# Patient Record
Sex: FEMALE | Race: WHITE | Employment: FULL TIME | URBAN - METROPOLITAN AREA
[De-identification: names, ages, dates, MRNs, and addresses within clinical notes are randomized per-mention and may not be internally consistent; named-entity substitution may affect disease eponyms.]

---

## 2017-03-27 DIAGNOSIS — E03.4 HYPOTHYROIDISM DUE TO ACQUIRED ATROPHY OF THYROID: ICD-10-CM

## 2017-03-27 RX ORDER — LIOTHYRONINE SODIUM 5 UG/1
TABLET ORAL
Qty: 270 TAB | Refills: 3 | Status: SHIPPED | OUTPATIENT
Start: 2017-03-27 | End: 2017-06-06 | Stop reason: SDUPTHER

## 2017-03-27 RX ORDER — LEVOTHYROXINE SODIUM 75 UG/1
75 TABLET ORAL
Qty: 90 TAB | Refills: 3 | Status: SHIPPED | OUTPATIENT
Start: 2017-03-27 | End: 2017-06-18 | Stop reason: SDUPTHER

## 2017-03-27 NOTE — TELEPHONE ENCOUNTER
From: Alfonzo Barrow  To: Chico Plummer MD  Sent: 3/27/2017 12:35 PM EDT  Subject: Medication Renewal Request    Original authorizing provider: MD Alfonzo Panda would like a refill of the following medications:  liothyronine (CYTOMEL) 5 mcg tablet Chico Plummer MD]  levothyroxine (SYNTHROID) 75 mcg tablet [Vanessa Aldana MD]    Preferred pharmacy: [address] [place] [address] Way:

## 2017-06-06 ENCOUNTER — OFFICE VISIT (OUTPATIENT)
Dept: ENDOCRINOLOGY | Age: 47
End: 2017-06-06

## 2017-06-06 VITALS
WEIGHT: 228.8 LBS | SYSTOLIC BLOOD PRESSURE: 141 MMHG | BODY MASS INDEX: 40.54 KG/M2 | DIASTOLIC BLOOD PRESSURE: 76 MMHG | HEART RATE: 80 BPM | TEMPERATURE: 97.4 F | RESPIRATION RATE: 18 BRPM | HEIGHT: 63 IN | OXYGEN SATURATION: 99 %

## 2017-06-06 DIAGNOSIS — N91.2 AMENORRHEA: ICD-10-CM

## 2017-06-06 DIAGNOSIS — E53.8 VITAMIN B12 DEFICIENCY: ICD-10-CM

## 2017-06-06 DIAGNOSIS — E03.4 HYPOTHYROIDISM DUE TO ACQUIRED ATROPHY OF THYROID: ICD-10-CM

## 2017-06-06 DIAGNOSIS — E55.9 VITAMIN D DEFICIENCY: ICD-10-CM

## 2017-06-06 DIAGNOSIS — E06.3 HASHIMOTO'S DISEASE: Primary | ICD-10-CM

## 2017-06-06 NOTE — PROGRESS NOTES
[facility]               Ronnell Holder MD        [address] 78 [phone] Fax [phone]           Patient Information  Date:6/6/2017  Name : Krystyna Tolentino 41 y.o.     YOB: 1970         Referred by: Clifford Stanley MD         History of present illness    Krystyna Tolentino is a 41 y.o. female  here for fu of thyroid. Seen Reproductive Endo - was on clomid, underwent artificial insemination - no pregnancy   Has no cycles since May 2016, pregnancy test negative      She has not had menstrual cycles for almost a year, not on any birth control pills      On LT4 50 mcg along with Cytomel  Compliant with the medications    She  denies sleep apnea symptoms  Denies nervousness,shakiness,palpitations      No FH of thyroid cancer     Wt Readings from Last 3 Encounters:   06/06/17 228 lb 12.8 oz (103.8 kg)   11/22/16 226 lb (102.5 kg)   04/14/16 228 lb 8 oz (103.6 kg)       Past Medical History:   Diagnosis Date    Goiter diffuse, nontoxic     Hashimoto's disease     Primary hypothyroidism        Current Outpatient Prescriptions   Medication Sig    liothyronine (CYTOMEL) 5 mcg tablet TAKE 2 TABLETS BY MOUTH IN THE MORNING AND 1 TABLET AT 1PM    levothyroxine (SYNTHROID) 75 mcg tablet Take 1 Tab by mouth Daily (before breakfast).  ergocalciferol (VITAMIN D2) 50,000 unit capsule Take 1 Cap by mouth every thirty (30) days. No current facility-administered medications for this visit. Review of Systems:  -   - Eyes: no blurry vision or double vision  - Cardiovascular: no chest pain or palpitations  - Musculoskeletal: no joint pains + weakness  - Integumentary: no rashes  -     Physical Examination:  - Blood pressure 141/76, pulse 80, temperature 97.4 °F (36.3 °C), temperature source Oral, resp. rate 18, height 5' 3\" (1.6 m), weight 228 lb 12.8 oz (103.8 kg), SpO2 99 %. Body mass index is 40.53 kg/(m^2).   - General: pleasant, no distress, good eye contact  - HEENT: no exopthalmos, no periorbital edema, no scleral/conjunctival injection, EOMI, no lid lag or stare  - Neck: supple,thyromegaly - US - no goiter   - Cardiovascular: regular,  normal S1 and S2, no murmurs  - Respiratory: clear to auscultation bilaterally  - Gastrointestinal: soft, nontender, nondistended, BS +  - Musculoskeletal: no tremors, no edema  - Neurological: alert and oriented   - Psychiatric: normal mood and affect  - Skin - normal turgor    Data Reviewed:     Lab Results   Component Value Date/Time    TSH 3.310 06/01/2017 09:07 AM    T4, Free 1.23 08/05/2014 02:46 PM      US -   Impression:  The thyroid gland is diffusely heterogenous in texture with hypoechoic micronodules and surroundning echogenic rim  suggestive of Hashimoto's thyroiditis. No dominant nodules    [x] Reviewed labs    Assessment/Plan:     1. Hashimoto's disease/Primary hypothyroidism - no goiter on US 5/14  2. Obesity Body mass index is 40.53 kg/(m^2). 3. Vitamin D deficiency  4. Secondary amenorrhea - workup secondary amenorrhea: FSH, estradiol, testosterone prolactin, TSH normal     Reviewed labs and discussed with pt    Levothyroxine  75 mcg daily, continue Cytomel. Goal of TSH pre-pregnancy and pregnancy less than 2.5.     -  ,Cytomel 10 mcg daily and 5 mcg noon   Supplement Vit D , stopped Vit B12   Denies sleep apnea symptoms -         Follow-up Disposition: Not on File    Thank you for allowing me to participate in the care of this patient.     Jose Francis MD

## 2017-06-06 NOTE — MR AVS SNAPSHOT
Visit Information Date & Time Provider Department Dept. Phone Encounter #  
 6/6/2017  8:45 AM Cain James MD [facility] Diabetes [facility] Endocrinology [phone] [id number] Follow-up Instructions Return in about 6 months (around 12/6/2017). Upcoming Health Maintenance Date Due DTaP/Tdap/Td series (1 - Tdap) 4/15/1993 PAP AKA CERVICAL CYTOLOGY 4/15/1993 INFLUENZA AGE 9 TO ADULT 8/1/2017 Allergies as of 6/6/2017  Review Complete On: 6/6/2017 By: Cain James MD  
 No Known Allergies Current Immunizations  Never Reviewed No immunizations on file. Not reviewed this visit You Were Diagnosed With   
  
 Codes Comments Hashimoto's disease    -  Primary ICD-10-CM: E06.3 ICD-9-CM: 245.2 Hypothyroidism due to acquired atrophy of thyroid     ICD-10-CM: E03.4 ICD-9-CM: 244.8, 246.8 Vitamin B12 deficiency     ICD-10-CM: E53.8 ICD-9-CM: 266.2 Vitamin D deficiency     ICD-10-CM: E55.9 ICD-9-CM: 268.9 Amenorrhea     ICD-10-CM: N91.2 ICD-9-CM: 626.0 Vitals BP Pulse Temp Resp Height(growth percentile) Weight(growth percentile) 141/76 (BP 1 Location: Right arm, BP Patient Position: Sitting) 80 97.4 °F (36.3 °C) (Oral) 18 5' 3\" (1.6 m) 228 lb 12.8 oz (103.8 kg) SpO2 BMI OB Status Smoking Status 99% 40.53 kg/m2 Having regular periods Never Smoker Vitals History BMI and BSA Data Body Mass Index Body Surface Area 40.53 kg/m 2 2.15 m 2 Preferred Pharmacy Pharmacy Name Phone [facility] [address] [phone] Your Updated Medication List  
  
   
This list is accurate as of: 6/6/17  9:16 AM.  Always use your most recent med list.  
  
  
  
  
 ergocalciferol 50,000 unit capsule Commonly known as:  VITAMIN D2 Take 1 Cap by mouth every thirty (30) days. levothyroxine 75 mcg tablet Commonly known as:  SYNTHROID Take 1 Tab by mouth Daily (before breakfast). liothyronine 5 mcg tablet Commonly known as:  CYTOMEL  
TAKE 2 TABLETS BY MOUTH IN THE MORNING AND 1 TABLET AT 1PM  
  
  
  
  
We Performed the Following DHEA SULFATE [80790 CPT(R)] ESTRADIOL [id number] CPT(R)] FOLLICLE STIMULATING HORMONE [78690 CPT(R)] LIPID PANEL [07062 CPT(R)] LUTEINIZING HORMONE [id number] CPT(R)] PROLACTIN [39165 CPT(R)] TESTOSTERONE, TOTAL, FEMALE/CHILD [id number] CPT(R)] Follow-up Instructions Return in about 6 months (around 12/6/2017). Introducing [facility] & HEALTH SERVICES! Dear Antoni Saavedra: 
Thank you for requesting a [employer] account. Our records indicate that you already have an active [employer] account. You can access your account anytime at [email or web address] Did you know that you can access your hospital and ER discharge instructions at any time in [employer]? You can also review all of your test results from your hospital stay or ER visit. Additional Information If you have questions, please visit the Frequently Asked Questions section of the [employer] website at [email or web address] Remember, [employer] is NOT to be used for urgent needs. For medical emergencies, dial 911. Now available from your iPhone and Android! Please provide this summary of care documentation to your next provider. Your primary care clinician is listed as Jennifer Adame. If you have any questions after today's visit, please call [phone].

## 2017-06-06 NOTE — PROGRESS NOTES
Jennie Brennan is a 41 y.o. female here for   Chief Complaint   Patient presents with    Thyroid Problem       Wt Readings from Last 3 Encounters:   11/22/16 226 lb (102.5 kg)   04/14/16 228 lb 8 oz (103.6 kg)   09/15/15 230 lb (104.3 kg)     Temp Readings from Last 3 Encounters:   11/22/16 96.9 °F (36.1 °C) (Oral)   04/14/16 96.9 °F (36.1 °C) (Oral)   09/15/15 97.7 °F (36.5 °C) (Oral)     BP Readings from Last 3 Encounters:   11/22/16 143/64   04/14/16 135/77   09/15/15 144/75     Pulse Readings from Last 3 Encounters:   11/22/16 67   04/14/16 79   09/15/15 73

## 2017-06-18 DIAGNOSIS — E03.4 HYPOTHYROIDISM DUE TO ACQUIRED ATROPHY OF THYROID: ICD-10-CM

## 2017-06-18 LAB
CHOLEST SERPL-MCNC: 188 MG/DL (ref 100–199)
DHEA-S SERPL-MCNC: 47.7 UG/DL (ref 41.2–243.7)
ESTRADIOL SERPL-MCNC: 13 PG/ML
FSH SERPL-ACNC: 43.6 MIU/ML
HDLC SERPL-MCNC: 56 MG/DL
INTERPRETATION, 910389: NORMAL
LDLC SERPL CALC-MCNC: 111 MG/DL (ref 0–99)
LH SERPL-ACNC: 30 MIU/ML
PROLACTIN SERPL-MCNC: 10.4 NG/ML (ref 4.8–23.3)
TESTOST SERPL-MCNC: 13.8 NG/DL
TRIGL SERPL-MCNC: 105 MG/DL (ref 0–149)
VLDLC SERPL CALC-MCNC: 21 MG/DL (ref 5–40)

## 2017-06-18 NOTE — PROGRESS NOTES
Not yet menopausal but could be perimenopausal - meaning she is going through the change and may become menopausal soon

## 2017-06-19 RX ORDER — LEVOTHYROXINE SODIUM 75 UG/1
75 TABLET ORAL
Qty: 90 TAB | Refills: 3 | Status: SHIPPED | OUTPATIENT
Start: 2017-06-19 | End: 2017-12-08 | Stop reason: SDUPTHER

## 2017-06-19 RX ORDER — LIOTHYRONINE SODIUM 5 UG/1
TABLET ORAL
Qty: 270 TAB | Refills: 3 | Status: SHIPPED | OUTPATIENT
Start: 2017-06-19 | End: 2017-09-29 | Stop reason: SDUPTHER

## 2017-06-19 NOTE — TELEPHONE ENCOUNTER
From: Dominga Ibarra  To: Coleen Crawford MD  Sent: 6/18/2017 10:28 PM EDT  Subject: Medication Renewal Request    Original authorizing provider: MD Dominga Geller would like a refill of the following medications:  levothyroxine (SYNTHROID) 75 mcg tablet [Vanessa Cary MD]  liothyronine (CYTOMEL) 5 mcg tablet [Vanessa Cary MD]    Preferred pharmacy: [address] AT [facility]:

## 2017-06-21 ENCOUNTER — TELEPHONE (OUTPATIENT)
Dept: ENDOCRINOLOGY | Age: 47
End: 2017-06-21

## 2017-06-21 NOTE — TELEPHONE ENCOUNTER
Informed pt of results below.  Pt verbalized understanding     ----- Message from Jena Milian MD sent at 6/18/2017  3:46 PM EDT -----  Not yet menopausal but could be perimenopausal - meaning she is going through the change and may become menopausal soon

## 2017-09-18 ENCOUNTER — OFFICE VISIT (OUTPATIENT)
Dept: FAMILY MEDICINE CLINIC | Age: 47
End: 2017-09-18

## 2017-09-18 VITALS
BODY MASS INDEX: 41.11 KG/M2 | WEIGHT: 232 LBS | RESPIRATION RATE: 16 BRPM | HEART RATE: 69 BPM | SYSTOLIC BLOOD PRESSURE: 150 MMHG | TEMPERATURE: 98 F | HEIGHT: 63 IN | OXYGEN SATURATION: 98 % | DIASTOLIC BLOOD PRESSURE: 100 MMHG

## 2017-09-18 DIAGNOSIS — Z00.00 WELL ADULT EXAM: Primary | ICD-10-CM

## 2017-09-18 DIAGNOSIS — R10.11 RIGHT UPPER QUADRANT PAIN: ICD-10-CM

## 2017-09-18 RX ORDER — CYCLOBENZAPRINE HCL 5 MG
TABLET ORAL
Refills: 0 | COMMUNITY
Start: 2017-08-22 | End: 2017-09-18 | Stop reason: ALTCHOICE

## 2017-09-18 RX ORDER — BUTALBITAL, ACETAMINOPHEN AND CAFFEINE 50; 325; 40 MG/1; MG/1; MG/1
TABLET ORAL
Refills: 0 | COMMUNITY
Start: 2017-08-22 | End: 2017-09-18 | Stop reason: ALTCHOICE

## 2017-09-18 NOTE — MR AVS SNAPSHOT
Visit Information Date & Time Provider Department Dept. Phone Encounter #  
 9/18/2017 11:00 AM Elmer Love NP [facility] [facility] [phone] [id number] Your Appointments 12/8/2017  8:30 AM  
ROUTINE CARE with Dick Napier MD  
Care Diabetes & Endocrinology [address] [facility]) Appt Note: 6mo fu dm  
 [address] [facility] [address]  
[phone]  
  
   
 [address] [place] [address] Upcoming Health Maintenance Date Due DTaP/Tdap/Td series (1 - Tdap) 4/15/1993 PAP AKA CERVICAL CYTOLOGY 4/15/1993 INFLUENZA AGE 9 TO ADULT 8/1/2017 Allergies as of 9/18/2017  Review Complete On: 9/18/2017 By: Elmer Love NP No Known Allergies Current Immunizations  Never Reviewed No immunizations on file. Not reviewed this visit You Were Diagnosed With   
  
 Codes Comments Well adult exam    -  Primary ICD-10-CM: Z00.00 ICD-9-CM: V70.0 Right upper quadrant pain     ICD-10-CM: R10.11 ICD-9-CM: 789.01 Vitals BP Pulse Temp Resp Height(growth percentile) Weight(growth percentile) (!) 150/100 69 98 °F (36.7 °C) (Oral) 16 5' 3\" (1.6 m) 232 lb (105.2 kg) SpO2 BMI OB Status Smoking Status 98% 41.1 kg/m2 Unknown Never Smoker Vitals History BMI and BSA Data Body Mass Index Body Surface Area  
 41.1 kg/m 2 2.16 m 2 Preferred Pharmacy Pharmacy Name Phone [facility] [address] [place] [address] [phone] Your Updated Medication List  
  
   
This list is accurate as of: 9/18/17 11:39 AM.  Always use your most recent med list.  
  
  
  
  
 ergocalciferol 50,000 unit capsule Commonly known as:  VITAMIN D2 Take 1 Cap by mouth every thirty (30) days. levothyroxine 75 mcg tablet Commonly known as:  SYNTHROID Take 1 Tab by mouth Daily (before breakfast). liothyronine 5 mcg tablet Commonly known as:  CYTOMEL  
TAKE 2 TABLETS BY MOUTH IN THE MORNING AND 1 TABLET AT 1PM  
  
  
  
  
We Performed the Following CBC WITH AUTOMATED DIFF [60077 CPT(R)] LIPID PANEL [89199 CPT(R)] METABOLIC PANEL, COMPREHENSIVE [56157 CPT(R)] To-Do List   
 09/22/2017 Imaging:  US ABD COMP Patient Instructions Waist circumference: 44 in Introducing [facility] & [facility] SERVICES! Dear Tawny Arrow: 
Thank you for requesting a [employer] account. Our records indicate that you already have an active [employer] account. You can access your account anytime at [email or web address] Did you know that you can access your hospital and ER discharge instructions at any time in [employer]? You can also review all of your test results from your hospital stay or ER visit. Additional Information If you have questions, please visit the Frequently Asked Questions section of the [employer] website at [email or web address] Remember, [employer] is NOT to be used for urgent needs. For medical emergencies, dial 911. Now available from your iPhone and Android! Please provide this summary of care documentation to your next provider. Your primary care clinician is listed as Triston Jones. If you have any questions after today's visit, please call [phone].

## 2017-09-18 NOTE — PROGRESS NOTES
Subjective:   41 y.o. female for Well Woman Check. Her gyne and breast care is done elsewhere by her Ob-Gyne physician. Patient Active Problem List    Diagnosis Date Noted    Amenorrhea 06/06/2017    Vitamin B12 deficiency 08/06/2014    Vitamin D deficiency 08/06/2014    Obesity 03/08/2014    Hashimoto's disease 03/05/2014    Hypothyroid 03/05/2014     Current Outpatient Prescriptions   Medication Sig Dispense Refill    levothyroxine (SYNTHROID) 75 mcg tablet Take 1 Tab by mouth Daily (before breakfast). 90 Tab 3    liothyronine (CYTOMEL) 5 mcg tablet TAKE 2 TABLETS BY MOUTH IN THE MORNING AND 1 TABLET AT 1PM 270 Tab 3    ergocalciferol (VITAMIN D2) 50,000 unit capsule Take 1 Cap by mouth every thirty (30) days. 12 Cap 0     Family History   Problem Relation Age of Onset    Diabetes Maternal Grandmother      Social History   Substance Use Topics    Smoking status: Never Smoker    Smokeless tobacco: Never Used    Alcohol use No             ROS: Feeling generally well. No TIA's or unusual headaches, no dysphagia. No prolonged cough. No dyspnea or chest pain on exertion. No abdominal pain, change in bowel habits, black or bloody stools. No urinary tract symptoms. No new or unusual musculoskeletal symptoms. Specific concerns today: having right upper quad pain, [place] after meals, still has gallbladder. Objective: The patient appears well, alert, oriented x 3, in no distress. Visit Vitals    BP (!) 150/100    Pulse 69    Temp 98 °F (36.7 °C) (Oral)    Resp 16    Ht 5' 3\" (1.6 m)    Wt 232 lb (105.2 kg)    SpO2 98%    BMI 41.1 kg/m2     ENT normal.  Neck supple. No adenopathy or thyromegaly. DERRELL. Lungs are clear, good air entry, no wheezes, rhonchi or rales. S1 and S2 normal, no murmurs, regular rate and rhythm. Abdomen soft without tenderness, guarding, mass or organomegaly. Extremities show no edema, normal peripheral pulses. Neurological is normal, no focal findings.   Breast and Pelvic exams are deferred. Assessment/Plan:   Well Woman  lose weight, increase physical activity, follow low fat diet, follow low salt diet, routine labs ordered  Encounter Diagnoses   Name Primary?  Well adult exam Yes    Right upper quadrant pain      Orders Placed This Encounter    US ABD COMP    CBC WITH AUTOMATED DIFF    METABOLIC PANEL, COMPREHENSIVE    LIPID PANEL     I have discussed the diagnosis with the patient and the intended plan as seen in the above orders. The patient has received an after-visit summary and questions were answered concerning future plans. Patient conveyed understanding of the plan at the time of the visit.     Alicia Cardona, MSN, ANP  9/18/2017

## 2017-09-18 NOTE — PROGRESS NOTES
1. Have you been to the ER, urgent care clinic since your last visit? Hospitalized since your last visit? No    2. Have you seen or consulted any other health care providers outside of the [address] [place] [address] since your last visit? Include any pap smears or colon screening.  No    Chief Complaint   Patient presents with    Establish Care    Complete Physical    Labs     fasting

## 2017-09-19 LAB
ALBUMIN SERPL-MCNC: 4.4 G/DL (ref 3.5–5.5)
ALBUMIN/GLOB SERPL: 1.7 {RATIO} (ref 1.2–2.2)
ALP SERPL-CCNC: 113 IU/L (ref 39–117)
ALT SERPL-CCNC: 20 IU/L (ref 0–32)
AST SERPL-CCNC: 19 IU/L (ref 0–40)
BASOPHILS # BLD AUTO: 0 X10E3/UL (ref 0–0.2)
BASOPHILS NFR BLD AUTO: 0 %
BILIRUB SERPL-MCNC: 0.3 MG/DL (ref 0–1.2)
BUN SERPL-MCNC: 9 MG/DL (ref 6–24)
BUN/CREAT SERPL: 14 (ref 9–23)
CALCIUM SERPL-MCNC: 9.2 MG/DL (ref 8.7–10.2)
CHLORIDE SERPL-SCNC: 100 MMOL/L (ref 96–106)
CHOLEST SERPL-MCNC: 202 MG/DL (ref 100–199)
CO2 SERPL-SCNC: 24 MMOL/L (ref 18–29)
CREAT SERPL-MCNC: 0.65 MG/DL (ref 0.57–1)
EOSINOPHIL # BLD AUTO: 0.3 X10E3/UL (ref 0–0.4)
EOSINOPHIL NFR BLD AUTO: 3 %
ERYTHROCYTE [DISTWIDTH] IN BLOOD BY AUTOMATED COUNT: 14.7 % (ref 12.3–15.4)
GLOBULIN SER CALC-MCNC: 2.6 G/DL (ref 1.5–4.5)
GLUCOSE SERPL-MCNC: 89 MG/DL (ref 65–99)
HCT VFR BLD AUTO: 38.8 % (ref 34–46.6)
HDLC SERPL-MCNC: 52 MG/DL
HGB BLD-MCNC: 12.9 G/DL (ref 11.1–15.9)
IMM GRANULOCYTES # BLD: 0.1 X10E3/UL (ref 0–0.1)
IMM GRANULOCYTES NFR BLD: 1 %
INTERPRETATION, 910389: NORMAL
LDLC SERPL CALC-MCNC: 113 MG/DL (ref 0–99)
LYMPHOCYTES # BLD AUTO: 2.5 X10E3/UL (ref 0.7–3.1)
LYMPHOCYTES NFR BLD AUTO: 24 %
MCH RBC QN AUTO: 27.9 PG (ref 26.6–33)
MCHC RBC AUTO-ENTMCNC: 33.2 G/DL (ref 31.5–35.7)
MCV RBC AUTO: 84 FL (ref 79–97)
MONOCYTES # BLD AUTO: 0.7 X10E3/UL (ref 0.1–0.9)
MONOCYTES NFR BLD AUTO: 7 %
NEUTROPHILS # BLD AUTO: 6.7 X10E3/UL (ref 1.4–7)
NEUTROPHILS NFR BLD AUTO: 65 %
PLATELET # BLD AUTO: 325 X10E3/UL (ref 150–379)
POTASSIUM SERPL-SCNC: 4.2 MMOL/L (ref 3.5–5.2)
PROT SERPL-MCNC: 7 G/DL (ref 6–8.5)
RBC # BLD AUTO: 4.62 X10E6/UL (ref 3.77–5.28)
SODIUM SERPL-SCNC: 141 MMOL/L (ref 134–144)
TRIGL SERPL-MCNC: 185 MG/DL (ref 0–149)
VLDLC SERPL CALC-MCNC: 37 MG/DL (ref 5–40)
WBC # BLD AUTO: 10.3 X10E3/UL (ref 3.4–10.8)

## 2017-09-20 NOTE — PROGRESS NOTES
Hey there, overall your labs look pretty good. Your cholesterol is borderline high so just work on diet and exercise. Watch the diet for red meat/pork, dairy products, and fried/fast foods.   Recheck 6 months fasting, Claudia

## 2017-09-27 DIAGNOSIS — E03.4 HYPOTHYROIDISM DUE TO ACQUIRED ATROPHY OF THYROID: ICD-10-CM

## 2017-09-28 RX ORDER — LIOTHYRONINE SODIUM 5 UG/1
TABLET ORAL
Refills: 0 | Status: CANCELLED | OUTPATIENT
Start: 2017-09-28

## 2017-09-28 NOTE — TELEPHONE ENCOUNTER
From: Anai Link  To: Myles Miller MD  Sent: 9/27/2017 4:54 PM EDT  Subject: Medication Renewal Request    Original authorizing provider: MD Anai Belcher would like a refill of the following medications:  liothyronine (CYTOMEL) 5 mcg tablet [Vanessa Lomeli MD]    Preferred pharmacy: [address] [place] [address] AT [place] [address]    Comment:  Dr. Kyara Milan, Can you please see if I can take a different dosage of this medication? I have new insurance and they are charging me triple the price because I take 3 pills a day. Is there anyway to decrease it to just 1 pill a day? I could come in for an appt. if necessary.

## 2017-09-29 RX ORDER — LIOTHYRONINE SODIUM 25 UG/1
12.5 TABLET ORAL DAILY
Qty: 45 TAB | Refills: 3 | Status: SHIPPED | OUTPATIENT
Start: 2017-09-29 | End: 2017-12-08 | Stop reason: ALTCHOICE

## 2017-09-29 NOTE — TELEPHONE ENCOUNTER
Explain  that the next dose is 25 mcg , she is on 15 mcg     The closest we can get is 12.5 mcg by taking half a tablet of 25 mcg , may not be the same     If ok we will send it

## 2017-09-29 NOTE — TELEPHONE ENCOUNTER
Informed pt next dose is 25 mcg and she can take 1/2 tab but it may not be the same.  Pt states she does not mind taking 12.5 mcg

## 2017-10-01 ENCOUNTER — HOSPITAL ENCOUNTER (OUTPATIENT)
Dept: ULTRASOUND IMAGING | Age: 47
Discharge: HOME | End: 2017-10-01
Attending: NURSE PRACTITIONER
Payer: COMMERCIAL

## 2017-10-01 DIAGNOSIS — R10.11 RIGHT UPPER QUADRANT PAIN: ICD-10-CM

## 2017-10-01 PROCEDURE — 76700 US EXAM ABDOM COMPLETE: CPT

## 2017-10-02 NOTE — PROGRESS NOTES
Hey there, your Ultrasound showed no gallstones and no other abnormalities seen, how is the pain?  [facility]

## 2017-10-11 ENCOUNTER — OFFICE VISIT (OUTPATIENT)
Dept: FAMILY MEDICINE CLINIC | Age: 47
End: 2017-10-11

## 2017-10-11 VITALS
WEIGHT: 231 LBS | HEIGHT: 63 IN | HEART RATE: 81 BPM | SYSTOLIC BLOOD PRESSURE: 120 MMHG | OXYGEN SATURATION: 98 % | BODY MASS INDEX: 40.93 KG/M2 | RESPIRATION RATE: 16 BRPM | DIASTOLIC BLOOD PRESSURE: 78 MMHG | TEMPERATURE: 98.1 F

## 2017-10-11 DIAGNOSIS — R10.11 RIGHT UPPER QUADRANT ABDOMINAL PAIN: Primary | ICD-10-CM

## 2017-10-11 NOTE — PROGRESS NOTES
Chief Complaint   Patient presents with    Follow-up    Abdominal Pain     c/o continuous abdominal pain, experiences pain after eating     1. Have you been to the ER, urgent care clinic since your last visit? Hospitalized since your last visit? No    2. Have you seen or consulted any other health care providers outside of the [address] [place] [address] since your last visit? Include any pap smears or colon screening.  No

## 2017-10-11 NOTE — PROGRESS NOTES
HISTORY OF PRESENT ILLNESS  Mahad Beaulieu is a 41 y.o. female. HPI  Patient is here for follow up of US abd  No changes with gall bladder seen for stone work up  Still having right upper quad pain post meals  No bowel changes  Did note to have fatty liver on US however    ROS  A comprehensive review of system was obtained and negative except findings in the HPI    Visit Vitals    /78    Pulse 81    Temp 98.1 °F (36.7 °C) (Oral)    Resp 16    Ht 5' 3\" (1.6 m)    Wt 231 lb (104.8 kg)    SpO2 98%    BMI 40.92 kg/m2     Physical Exam   Constitutional: She is oriented to person, place, and time. She appears well-developed and well-nourished. Neck: No JVD present. Cardiovascular: Normal rate, regular rhythm and intact distal pulses. Exam reveals no gallop and no friction rub. No murmur heard. Pulmonary/Chest: Effort normal and breath sounds normal. No respiratory distress. She has no wheezes. Musculoskeletal: She exhibits no edema. Neurological: She is alert and oriented to person, place, and time. Skin: Skin is warm. Nursing note and vitals reviewed. ASSESSMENT and PLAN  Encounter Diagnoses   Name Primary?  Right upper quadrant abdominal pain Yes     Orders Placed This Encounter    NM HEPATOBILIARY DUCT SCAN     Will move forward with HIDA scan  Did review fatty liver and monitoring for cholesterol and liver functions  Reviewed weight loss and diet changes as well    I have discussed the diagnosis with the patient and the intended plan as seen in the above orders. The patient has received an after-visit summary and questions were answered concerning future plans. Patient conveyed understanding of the plan at the time of the visit.     Iesha Duggan, MSN, ANP  10/11/2017

## 2017-10-11 NOTE — MR AVS SNAPSHOT
Visit Information Date & Time Provider Department Dept. Phone Encounter #  
 10/11/2017  9:00 AM Ashly Beal, NP [facility] [phone] [id number] Your Appointments 12/8/2017  8:30 AM  
ROUTINE CARE with Jesús Shin MD  
Care Diabetes & Endocrinology [facility]) Appt Note: 6mo fu dm  
 [address] [place] [address]  
[phone]  
  
   
 [address] [place] [address] Upcoming Health Maintenance Date Due DTaP/Tdap/Td series (1 - Tdap) 4/15/1993 PAP AKA CERVICAL CYTOLOGY 4/15/1993 INFLUENZA AGE 9 TO ADULT 8/1/2017 Allergies as of 10/11/2017  Review Complete On: 10/11/2017 By: Nataliia Last LPN No Known Allergies Current Immunizations  Never Reviewed No immunizations on file. Not reviewed this visit You Were Diagnosed With   
  
 Codes Comments Right upper quadrant abdominal pain    -  Primary ICD-10-CM: R10.11 ICD-9-CM: 789.01 Vitals BP Pulse Temp Resp Height(growth percentile) Weight(growth percentile) 120/78 81 98.1 °F (36.7 °C) (Oral) 16 5' 3\" (1.6 m) 231 lb (104.8 kg) SpO2 BMI OB Status Smoking Status 98% 40.92 kg/m2 Unknown Never Smoker Vitals History BMI and BSA Data Body Mass Index Body Surface Area 40.92 kg/m 2 2.16 m 2 Preferred Pharmacy Pharmacy Name Phone [facility] [phone] Your Updated Medication List  
  
   
This list is accurate as of: 10/11/17  9:30 AM.  Always use your most recent med list.  
  
  
  
  
 ergocalciferol 50,000 unit capsule Commonly known as:  VITAMIN D2 Take 1 Cap by mouth every thirty (30) days. levothyroxine 75 mcg tablet Commonly known as:  SYNTHROID Take 1 Tab by mouth Daily (before breakfast). liothyronine 25 mcg tablet Commonly known as:  CYTOMEL Take 0.5 Tabs by mouth daily. To-Do List   
 10/18/2017 Imaging:  NM HEPATOBILIARY DUCT SCAN Referral Information Referral ID Referred By Referred To  
  
 [id number] Zoraida Zuluaga ZORA Not Available Visits Status Start Date End Date 1 New Request 10/11/17 10/11/18 If your referral has a status of pending review or denied, additional information will be sent to support the outcome of this decision. Introducing [facility] & HE[facility] SERVICES! Dear Maida Rios: 
Thank you for requesting a iC[employer] account. Our records indicate that you already have an active iCouch account. You can access your account anytime at [email or web address] Did you know that you can access your hospital and ER discharge instructions at any time in iCouch? You can also review all of your test results from your hospital stay or ER visit. Additional Information If you have questions, please visit the Frequently Asked Questions section of the iC[employer] website at [email or web address] Remember, iCouch is NOT to be used for urgent needs. For medical emergencies, dial 911. Now available from your iPhone and Android! Please provide this summary of care documentation to your next provider. Your primary care clinician is listed as Susana Cao. If you have any questions after today's visit, please call [phone].

## 2017-12-08 ENCOUNTER — OFFICE VISIT (OUTPATIENT)
Dept: ENDOCRINOLOGY | Age: 47
End: 2017-12-08

## 2017-12-08 VITALS
HEIGHT: 63 IN | BODY MASS INDEX: 41.82 KG/M2 | SYSTOLIC BLOOD PRESSURE: 146 MMHG | RESPIRATION RATE: 16 BRPM | DIASTOLIC BLOOD PRESSURE: 75 MMHG | WEIGHT: 236 LBS | OXYGEN SATURATION: 100 % | HEART RATE: 82 BPM | TEMPERATURE: 96.7 F

## 2017-12-08 DIAGNOSIS — E03.4 HYPOTHYROIDISM DUE TO ACQUIRED ATROPHY OF THYROID: ICD-10-CM

## 2017-12-08 DIAGNOSIS — E06.3 HASHIMOTO'S DISEASE: ICD-10-CM

## 2017-12-08 DIAGNOSIS — E66.01 OBESITY, MORBID (HCC): ICD-10-CM

## 2017-12-08 DIAGNOSIS — E03.9 ACQUIRED HYPOTHYROIDISM: Primary | ICD-10-CM

## 2017-12-08 RX ORDER — LEVOTHYROXINE SODIUM 125 UG/1
125 TABLET ORAL
Qty: 30 TAB | Refills: 8 | Status: SHIPPED | OUTPATIENT
Start: 2017-12-08 | End: 2018-06-08 | Stop reason: SDUPTHER

## 2017-12-08 NOTE — PROGRESS NOTES
Dimitris Ashton is a 41 y.o. female here for   Chief Complaint   Patient presents with    Thyroid Problem       1. Have you been to the ER, urgent care clinic since your last visit? Hospitalized since your last visit? -no    2. Have you seen or consulted any other health care providers outside of the [address] [place] [address] since your last visit?   Include any pap smears or colon screening.-no    Wt Readings from Last 3 Encounters:   10/11/17 231 lb (104.8 kg)   09/18/17 232 lb (105.2 kg)   06/06/17 228 lb 12.8 oz (103.8 kg)     Temp Readings from Last 3 Encounters:   10/11/17 98.1 °F (36.7 °C) (Oral)   09/18/17 98 °F (36.7 °C) (Oral)   06/06/17 97.4 °F (36.3 °C) (Oral)     BP Readings from Last 3 Encounters:   10/11/17 120/78   09/18/17 (!) 150/100   06/06/17 141/76     Pulse Readings from Last 3 Encounters:   10/11/17 81   09/18/17 69   06/06/17 80

## 2017-12-08 NOTE — MR AVS SNAPSHOT
Visit Information Date & Time Provider Department Dept. Phone Encounter #  
 12/8/2017  8:30 AM Carlee Zuniga MD [facility] Diabetes & Endocrinology [phone] [id number] Follow-up Instructions Return in about 6 months (around 6/8/2018). Upcoming Health Maintenance Date Due DTaP/Tdap/Td series (1 - Tdap) 4/15/1993 PAP AKA CERVICAL CYTOLOGY 4/15/1993 Influenza Age 5 to Adult 8/1/2017 Allergies as of 12/8/2017  Review Complete On: 12/8/2017 By: Carlee Zuniga MD  
 No Known Allergies Current Immunizations  Never Reviewed No immunizations on file. Not reviewed this visit You Were Diagnosed With   
  
 Codes Comments Acquired hypothyroidism    -  Primary ICD-10-CM: E03.9 ICD-9-CM: 986. 9 Vitals BP Pulse Temp Resp Height(growth percentile) Weight(growth percentile) 146/75 (BP 1 Location: Right arm, BP Patient Position: Sitting) 82 96.7 °F (35.9 °C) (Oral) 16 5' 3\" (1.6 m) 236 lb (107 kg) SpO2 BMI OB Status Smoking Status 100% 41.81 kg/m2 Unknown Never Smoker Vitals History BMI and BSA Data Body Mass Index Body Surface Area  
 41.81 kg/m 2 2.18 m 2 Preferred Pharmacy Pharmacy Name Phone [facility] [address] [facility] [phone] Your Updated Medication List  
  
   
This list is accurate as of: 12/8/17  9:01 AM.  Always use your most recent med list.  
  
  
  
  
 ergocalciferol 50,000 unit capsule Commonly known as:  VITAMIN D2 Take 1 Cap by mouth every thirty (30) days. levothyroxine 75 mcg tablet Commonly known as:  SYNTHROID Take 1 Tab by mouth Daily (before breakfast). liothyronine 25 mcg tablet Commonly known as:  CYTOMEL Take 0.5 Tabs by mouth daily. Follow-up Instructions Return in about 6 months (around 6/8/2018). Introducing [facility] & HEALTH SERVICES! Dear Jayla James: Thank you for requesting a [employer] account. Our records indicate that you already have an active [employer] account. You can access your account anytime at [email or web address]. [employer] Did you know that you can access your hospital and ER discharge instructions at any time in [employer]? You can also review all of your test results from your hospital stay or ER visit. Additional Information If you have questions, please visit the Frequently Asked Questions section of the [employer] website at [email or web address] [facility]/[employer]/. Remember, [employer] is NOT to be used for urgent needs. For medical emergencies, dial 911. Now available from your iPhone and Android! Please provide this summary of care documentation to your next provider. Your primary care clinician is listed as Jared Manning. If you have any questions after today's visit, please call [phone].

## 2017-12-08 NOTE — PROGRESS NOTES
[facility] AND ENDOCRINOLOGY               Martinez Ervin MD        [address] 78 [phone] Fax [phone]           Patient Information  Date:12/8/2017  Name : Suman Stinson 41 y.o.     YOB: 1970         Referred by: Christina Recinos NP         History of present illness    Suman Stinson is a 41 y.o. female  here for fu of thyroid. She did not have menstrual cycles a year until recently. Continues to gain weight. Did not get the labs done this visit  Seen Reproductive Endo - was on clomid, underwent artificial insemination - no pregnancy       On LT4 50 mcg along with Cytomel  Compliant with the medications    She  denies sleep apnea symptoms  Denies nervousness,shakiness,palpitations      No FH of thyroid cancer     Wt Readings from Last 3 Encounters:   12/08/17 236 lb (107 kg)   10/11/17 231 lb (104.8 kg)   09/18/17 232 lb (105.2 kg)       Past Medical History:   Diagnosis Date    Goiter diffuse, nontoxic     Hashimoto's disease     Primary hypothyroidism        Current Outpatient Prescriptions   Medication Sig    liothyronine (CYTOMEL) 25 mcg tablet Take 0.5 Tabs by mouth daily.  levothyroxine (SYNTHROID) 75 mcg tablet Take 1 Tab by mouth Daily (before breakfast).  ergocalciferol (VITAMIN D2) 50,000 unit capsule Take 1 Cap by mouth every thirty (30) days. No current facility-administered medications for this visit. Review of Systems:  -   - Eyes: no blurry vision or double vision  - Cardiovascular: no chest pain or palpitations  - Musculoskeletal: no joint pains + weakness  - Integumentary: no rashes  -     Physical Examination:  - Blood pressure 146/75, pulse 82, temperature 96.7 °F (35.9 °C), temperature source Oral, resp. rate 16, height 5' 3\" (1.6 m), weight 236 lb (107 kg), SpO2 100 %. Body mass index is 41.81 kg/(m^2).   - General: pleasant, no distress, good eye contact  - HEENT: no exopthalmos, no periorbital edema, no scleral/conjunctival injection, EOMI, no lid lag or stare  - Neck: supple,thyromegaly - US - no goiter   - Cardiovascular: regular,  normal S1 and S2, no murmurs  - Respiratory: clear to auscultation bilaterally  - Gastrointestinal: soft, nontender, nondistended, BS +  - Musculoskeletal: no tremors, no edema  - Neurological: alert and oriented   - Psychiatric: normal mood and affect  - Skin - normal turgor    Data Reviewed:     Lab Results   Component Value Date/Time    TSH 3.310 06/01/2017 09:07 AM    T4, Free 1.23 08/05/2014 02:46 PM      US -   Impression:  The thyroid gland is diffusely heterogenous in texture with hypoechoic micronodules and surroundning echogenic rim  suggestive of Hashimoto's thyroiditis. No dominant nodules    [x] Reviewed labs    Assessment/Plan:     1. Hashimoto's disease/Primary hypothyroidism - no goiter on US 5/14  2. Obesity Body mass index is 41.81 kg/(m^2). 3. Vitamin D deficiency  4. Perimenopausal     She did not see much difference with Cytomel now, increase levothyroxine to 125 mcg daily. Discussed about the lifestyle changes, carbohydrate portion control. Dietary plan discussed. If no improvement qsymia can be tried      Supplement Vit D , Vit B12   Denies sleep apnea symptoms -         Follow-up Disposition: Not on File    Thank you for allowing me to participate in the care of this patient.     Jovanna Bailey MD

## 2017-12-19 DIAGNOSIS — E55.9 VITAMIN D DEFICIENCY: ICD-10-CM

## 2017-12-20 RX ORDER — ERGOCALCIFEROL 1.25 MG/1
CAPSULE ORAL
Qty: 12 CAP | Refills: 0 | Status: SHIPPED | OUTPATIENT
Start: 2017-12-20 | End: 2018-12-10 | Stop reason: SDUPTHER

## 2018-06-08 ENCOUNTER — OFFICE VISIT (OUTPATIENT)
Dept: ENDOCRINOLOGY | Age: 48
End: 2018-06-08

## 2018-06-08 VITALS
SYSTOLIC BLOOD PRESSURE: 148 MMHG | RESPIRATION RATE: 16 BRPM | DIASTOLIC BLOOD PRESSURE: 81 MMHG | BODY MASS INDEX: 39.82 KG/M2 | WEIGHT: 224.8 LBS | TEMPERATURE: 97.3 F | HEART RATE: 81 BPM

## 2018-06-08 DIAGNOSIS — E55.9 VITAMIN D DEFICIENCY: ICD-10-CM

## 2018-06-08 DIAGNOSIS — E66.01 OBESITY, MORBID (HCC): ICD-10-CM

## 2018-06-08 DIAGNOSIS — E03.4 HYPOTHYROIDISM DUE TO ACQUIRED ATROPHY OF THYROID: ICD-10-CM

## 2018-06-08 DIAGNOSIS — E06.3 HASHIMOTO'S DISEASE: Primary | ICD-10-CM

## 2018-06-08 RX ORDER — LEVOTHYROXINE SODIUM 125 UG/1
125 TABLET ORAL
Qty: 30 TAB | Refills: 11 | Status: SHIPPED | OUTPATIENT
Start: 2018-06-08 | End: 2018-09-23 | Stop reason: DRUGHIGH

## 2018-06-08 NOTE — MR AVS SNAPSHOT
[address] [facility] [address] [facility] [address] 
[phone] Patient: Mark Serrano MRN: [id number]:[id number] Visit Information Date & Time Provider Department Dept. Phone Encounter #  
 6/8/2018  8:45 AM Tahir Reynoso MD [facility] Diabetes & Endocrinology [phone] [id number] Follow-up Instructions Return in about 6 months (around 12/8/2018). Upcoming Health Maintenance Date Due DTaP/Tdap/Td series (1 - Tdap) 4/15/1993 PAP AKA CERVICAL CYTOLOGY 4/15/1993 Influenza Age 5 to Adult 8/1/2018 Allergies as of 6/8/2018  Review Complete On: 6/8/2018 By: Tahir Reynoso MD  
 No Known Allergies Current Immunizations  Never Reviewed No immunizations on file. Not reviewed this visit You Were Diagnosed With   
  
 Codes Comments Hashimoto's disease    -  Primary ICD-10-CM: E06.3 ICD-9-CM: 719. 2 Vitamin D deficiency     ICD-10-CM: E55.9 ICD-9-CM: 268.9 Vitals BP Pulse Temp Resp Height(growth percentile) Weight(growth percentile) 148/81 (BP 1 Location: Left arm, BP Patient Position: Sitting) 81 97.3 °F (36.3 °C) (Oral) 16 (P) 5' 3\" (1.6 m) 224 lb 12.8 oz (102 kg) BMI OB Status Smoking Status (P) 39.82 kg/m2 Unknown Never Smoker Vitals History BMI and BSA Data Body Mass Index Body Surface Area (P) 39.82 kg/m 2 (P) 2.13 m 2 Preferred Pharmacy Pharmacy Name Phone [facility] [address] [facility] [phone] Your Updated Medication List  
  
   
This list is accurate as of 6/8/18  9:19 AM.  Always use your most recent med list.  
  
  
  
  
 ergocalciferol 50,000 unit capsule Commonly known as:  ERGOCALCIFEROL  
TAKE ONE CAPSULE BY MOUTH EVERY 30 DAYS  
  
 levothyroxine 125 mcg tablet Commonly known as:  SYNTHROID Take 1 Tab by mouth Daily (before breakfast). Follow-up Instructions Return in about 6 months (around 12/8/2018). Patient Instructions Blood pressure goal is < 130/80 Introducing [facility] SERVICES! Dear Kym Morris: 
Thank you for requesting a [employer] account. Our records indicate that you already have an active [employer] account. You can access your account anytime at [email or web address]/[employer] Did you know that you can access your hospital and ER discharge instructions at any time in [employer]? You can also review all of your test results from your hospital stay or ER visit. Additional Information If you have questions, please visit the Frequently Asked Questions section of the [employer] website at [email or web address] Remember, [employer] is NOT to be used for urgent needs. For medical emergencies, dial 911. Now available from your iPhone and Android! Please provide this summary of care documentation to your next provider. Your primary care clinician is listed as Ann-Marie Chapa. If you have any questions after today's visit, please call [phone].

## 2018-06-08 NOTE — PROGRESS NOTES
Pravin Francis is a 57 y.o. female here for   Chief Complaint   Patient presents with    Thyroid Problem       1. Have you been to the ER, urgent care clinic since your last visit? Hospitalized since your last visit? - no    2. Have you seen or consulted any other health care providers outside of the [address] [place] [address] since your last visit?   Include any pap smears or colon screening.- no

## 2018-06-08 NOTE — PROGRESS NOTES
[facility] DIABETES AND ENDOCRINOLOGY               Rex Bashir MD        [address] [address] GN:[phone] Fax [phone]           Patient Information  Date:6/8/2018  Name : Ivana Livingston 57 y.o.     YOB: 1970         Referred by: Allie Lozada NP         History of present illness    Ivana Livingston is a 57 y.o. female  here for fu of thyroid. She has not had any menstrual cycles  Last week lost weight and symptoms 12 pounds, on ketogenic diet  Started 3 weeks ago  Taking the medication consistently  Blood pressure is slightly elevated    She  denies sleep apnea symptoms  Denies nervousness,shakiness,palpitations      No FH of thyroid cancer     Wt Readings from Last 3 Encounters:   06/08/18 224 lb 12.8 oz (102 kg)   12/08/17 236 lb (107 kg)   10/11/17 231 lb (104.8 kg)       Past Medical History:   Diagnosis Date    Goiter diffuse, nontoxic     Hashimoto's disease     Primary hypothyroidism        Current Outpatient Prescriptions   Medication Sig    levothyroxine (SYNTHROID) 125 mcg tablet Take 1 Tab by mouth Daily (before breakfast).  ergocalciferol (ERGOCALCIFEROL) 50,000 unit capsule TAKE ONE CAPSULE BY MOUTH EVERY 30 DAYS     No current facility-administered medications for this visit. Review of Systems:  -   - Eyes: no blurry vision or double vision  - Cardiovascular: no chest pain or palpitations  - Musculoskeletal: no joint pains + weakness  - Integumentary: no rashes  -     Physical Examination:  - Blood pressure 148/81, pulse 81, temperature 97.3 °F (36.3 °C), temperature source Oral, resp. rate 16, height (P) 5' 3\" (1.6 m), weight 224 lb 12.8 oz (102 kg). Body mass index is 39.82 kg/(m^2) (pended).   - General: pleasant, no distress, good eye contact  - HEENT: no exopthalmos, no periorbital edema, no scleral/conjunctival injection, EOMI, no lid lag or stare  - Neck: supple,thyromegaly - US - no goiter   - Cardiovascular: regular, normal S1 and S2, no murmurs  - Respiratory: clear to auscultation bilaterally  - Gastrointestinal: soft, nontender, nondistended, BS +  - Musculoskeletal: no tremors, no edema  - Neurological: alert and oriented   - Psychiatric: normal mood and affect  - Skin - normal turgor    Data Reviewed:     Lab Results   Component Value Date/Time    TSH 0.297 (L) 06/01/2018 09:00 AM    T4, Free 1.23 08/05/2014 02:46 PM      US -   Impression:  The thyroid gland is diffusely heterogenous in texture with hypoechoic micronodules and surroundning echogenic rim  suggestive of Hashimoto's thyroiditis. No dominant nodules    [x] Reviewed labs    Assessment/Plan:     1. Hashimoto's disease/Primary hypothyroidism - no goiter on US 5/14  2. Obesity Body mass index is 39.82 kg/(m^2) (pended). 3. Vitamin D deficiency  4. Postmenopausal     She did not see much difference with Cytomel . Continue levothyroxine same dose, TSH is slightly low because of the weight loss. If persistently low then we have to decrease the dose    Supplement Vit D , Vit B12     Blood pressure: Low-salt diet     obesity: Losing weight, commended on the weight loss          Follow-up Disposition: Not on File    Thank you for allowing me to participate in the care of this patient.     Nayan Valverde MD

## 2018-09-21 ENCOUNTER — OFFICE VISIT (OUTPATIENT)
Dept: FAMILY MEDICINE CLINIC | Age: 48
End: 2018-09-21

## 2018-09-21 VITALS
SYSTOLIC BLOOD PRESSURE: 127 MMHG | OXYGEN SATURATION: 97 % | BODY MASS INDEX: 40.43 KG/M2 | HEIGHT: 63 IN | WEIGHT: 228.2 LBS | HEART RATE: 81 BPM | RESPIRATION RATE: 18 BRPM | DIASTOLIC BLOOD PRESSURE: 84 MMHG | TEMPERATURE: 98.5 F

## 2018-09-21 DIAGNOSIS — E53.8 VITAMIN B12 DEFICIENCY: ICD-10-CM

## 2018-09-21 DIAGNOSIS — E55.9 VITAMIN D DEFICIENCY: ICD-10-CM

## 2018-09-21 DIAGNOSIS — E03.4 HYPOTHYROIDISM DUE TO ACQUIRED ATROPHY OF THYROID: ICD-10-CM

## 2018-09-21 DIAGNOSIS — Z00.00 WELL ADULT EXAM: Primary | ICD-10-CM

## 2018-09-21 NOTE — MR AVS SNAPSHOT
[address] [address] 
[phone] Patient: Ken Hernandez MRN: [id number] Visit Information Date & Time Provider Department Dept. Phone Encounter #  
 9/21/2018  3:00 PM BO Lazcano [facility] [phone] [id number] Your Appointments 12/3/2018  8:15 AM  
LAB with CDE NURSE [facility]ocrin[facility]) Appt Note: labs [address] [place] [address] [address] [facility] [address]  
[phone]  
  
   
 [address] [place] [address]  
  
    
 12/10/2018  8:30 AM  
ROUTINE CARE with Carlee Zuniga MD  
[facility] Diabetes & Endocrinology [facility]) Appt Note: F/U routine Hoshimoto Disease 6 months [address] [place] [address] [address] [facility] [address]  
[phone]  
  
   
 [address] [place] [address] Upcoming Health Maintenance Date Due DTaP/Tdap/Td series (1 - Tdap) 4/15/1993 PAP AKA CERVICAL CYTOLOGY 4/15/1993 Influenza Age 5 to Adult 8/1/2018 Allergies as of 9/21/2018  Review Complete On: 9/21/2018 By: Jessica Bonilla No Known Allergies Current Immunizations  Never Reviewed No immunizations on file. Not reviewed this visit You Were Diagnosed With   
  
 Codes Comments Well adult exam    -  Primary ICD-10-CM: Z00.00 ICD-9-CM: V70.0 Hypothyroidism due to acquired atrophy of thyroid     ICD-10-CM: E03.4 ICD-9-CM: 244.8, 246.8 Vitamin D deficiency     ICD-10-CM: E55.9 ICD-9-CM: 268.9 Vitamin B12 deficiency     ICD-10-CM: E53.8 ICD-9-CM: 266.2 Vitals BP Pulse Temp Resp Height(growth percentile) Weight(growth percentile) 127/84 (BP 1 Location: Right arm, BP Patient Position: Sitting) 81 98.5 °F (36.9 °C) (Oral) 18 5' 3\" (1.6 m) 228 lb 3.2 oz (103.5 kg) SpO2 BMI OB Status Smoking Status 97% 40.42 kg/m2 Unknown Never Smoker Vitals History BMI and BSA Data Body Mass Index Body Surface Area 40.42 kg/m 2 2.14 m 2 Preferred Pharmacy Pharmacy Name Phone [facility] [address] [phone] Your Updated Medication List  
  
   
This list is accurate as of 9/21/18  3:23 PM.  Always use your most recent med list.  
  
  
  
  
 ergocalciferol 50,000 unit capsule Commonly known as:  ERGOCALCIFEROL  
TAKE ONE CAPSULE BY MOUTH EVERY 30 DAYS  
  
 levothyroxine 125 mcg tablet Commonly known as:  SYNTHROID Take 1 Tab by mouth Daily (before breakfast). We Performed the Following CBC WITH AUTOMATED DIFF [65221 CPT(R)] LIPID PANEL [83629 CPT(R)] METABOLIC PANEL, COMPREHENSIVE [50287 CPT(R)] TSH 3RD GENERATION [72533 CPT(R)] VITAMIN B12 [id number] CPT(R)] VITAMIN D, 25 HYDROXY [id number] CPT(R)] Introducing [facility] SERVICES! Dear Ndea: 
Thank you for requesting a [employer] account. Our records indicate that you already have an active [employer] account. You can access your account anytime at [email or web address] Did you know that you can access your hospital and ER discharge instructions at any time in [employer]? You can also review all of your test results from your hospital stay or ER visit. Additional Information If you have questions, please visit the Frequently Asked Questions section of the [employer] website at [email or web address]/. Remember, [employer] is NOT to be used for urgent needs. For medical emergencies, dial 911. Now available from your iPhone and Android! Please provide this summary of care documentation to your next provider. Your primary care clinician is listed as Dariusz Bain. If you have any questions after today's visit, please call [phone].

## 2018-09-21 NOTE — PROGRESS NOTES
Travon Gonzalez is a 57 y.o. female    Chief Complaint   Patient presents with    Complete Physical       1. Have you been to the ER, urgent care clinic since your last visit? Hospitalized since your last visit? No  M  2. Have you seen or consulted any other health care providers outside of the [address] [place] [address] since your last visit? Include any pap smears or colon screening.  No       Visit Vitals    /84 (BP 1 Location: Right arm, BP Patient Position: Sitting)    Pulse 81    Temp 98.5 °F (36.9 °C) (Oral)    Resp 18    Ht 5' 3\" (1.6 m)    Wt 228 lb 3.2 oz (103.5 kg)    SpO2 97%    BMI 40.42 kg/m2           Health Maintenance Due   Topic Date Due    DTaP/Tdap/Td series (1 - Tdap) 04/15/1993    PAP AKA CERVICAL CYTOLOGY  04/15/1993    Influenza Age 9 to Adult  08/01/2018

## 2018-09-23 LAB
25(OH)D3+25(OH)D2 SERPL-MCNC: 24.8 NG/ML (ref 30–100)
ALBUMIN SERPL-MCNC: 4.2 G/DL (ref 3.5–5.5)
ALBUMIN/GLOB SERPL: 1.7 {RATIO} (ref 1.2–2.2)
ALP SERPL-CCNC: 108 IU/L (ref 39–117)
ALT SERPL-CCNC: 19 IU/L (ref 0–32)
AST SERPL-CCNC: 18 IU/L (ref 0–40)
BASOPHILS # BLD AUTO: 0 X10E3/UL (ref 0–0.2)
BASOPHILS NFR BLD AUTO: 0 %
BILIRUB SERPL-MCNC: 0.2 MG/DL (ref 0–1.2)
BUN SERPL-MCNC: 14 MG/DL (ref 6–24)
BUN/CREAT SERPL: 18 (ref 9–23)
CALCIUM SERPL-MCNC: 9.5 MG/DL (ref 8.7–10.2)
CHLORIDE SERPL-SCNC: 100 MMOL/L (ref 96–106)
CHOLEST SERPL-MCNC: 182 MG/DL (ref 100–199)
CO2 SERPL-SCNC: 25 MMOL/L (ref 20–29)
CREAT SERPL-MCNC: 0.79 MG/DL (ref 0.57–1)
EOSINOPHIL # BLD AUTO: 0.3 X10E3/UL (ref 0–0.4)
EOSINOPHIL NFR BLD AUTO: 3 %
ERYTHROCYTE [DISTWIDTH] IN BLOOD BY AUTOMATED COUNT: 14 % (ref 12.3–15.4)
GLOBULIN SER CALC-MCNC: 2.5 G/DL (ref 1.5–4.5)
GLUCOSE SERPL-MCNC: 97 MG/DL (ref 65–99)
HCT VFR BLD AUTO: 40.1 % (ref 34–46.6)
HDLC SERPL-MCNC: 48 MG/DL
HGB BLD-MCNC: 12.6 G/DL (ref 11.1–15.9)
IMM GRANULOCYTES # BLD: 0 X10E3/UL (ref 0–0.1)
IMM GRANULOCYTES NFR BLD: 0 %
INTERPRETATION, 910389: NORMAL
LDLC SERPL CALC-MCNC: 91 MG/DL (ref 0–99)
LYMPHOCYTES # BLD AUTO: 2.7 X10E3/UL (ref 0.7–3.1)
LYMPHOCYTES NFR BLD AUTO: 30 %
MCH RBC QN AUTO: 27.3 PG (ref 26.6–33)
MCHC RBC AUTO-ENTMCNC: 31.4 G/DL (ref 31.5–35.7)
MCV RBC AUTO: 87 FL (ref 79–97)
MONOCYTES # BLD AUTO: 0.6 X10E3/UL (ref 0.1–0.9)
MONOCYTES NFR BLD AUTO: 7 %
NEUTROPHILS # BLD AUTO: 5.5 X10E3/UL (ref 1.4–7)
NEUTROPHILS NFR BLD AUTO: 60 %
PLATELET # BLD AUTO: 340 X10E3/UL (ref 150–379)
POTASSIUM SERPL-SCNC: 4.2 MMOL/L (ref 3.5–5.2)
PROT SERPL-MCNC: 6.7 G/DL (ref 6–8.5)
RBC # BLD AUTO: 4.61 X10E6/UL (ref 3.77–5.28)
SODIUM SERPL-SCNC: 141 MMOL/L (ref 134–144)
TRIGL SERPL-MCNC: 216 MG/DL (ref 0–149)
TSH SERPL DL<=0.005 MIU/L-ACNC: 0.02 UIU/ML (ref 0.45–4.5)
VIT B12 SERPL-MCNC: 432 PG/ML (ref 232–1245)
VLDLC SERPL CALC-MCNC: 43 MG/DL (ref 5–40)
WBC # BLD AUTO: 9.2 X10E3/UL (ref 3.4–10.8)

## 2018-09-23 RX ORDER — LEVOTHYROXINE SODIUM 112 UG/1
112 TABLET ORAL
Qty: 30 TAB | Refills: 2 | Status: SHIPPED | OUTPATIENT
Start: 2018-09-23 | End: 2018-09-24 | Stop reason: SDUPTHER

## 2018-09-24 RX ORDER — LEVOTHYROXINE SODIUM 112 UG/1
112 TABLET ORAL
Qty: 30 TAB | Refills: 2 | Status: SHIPPED | OUTPATIENT
Start: 2018-09-24 | End: 2018-12-10 | Stop reason: ALTCHOICE

## 2018-09-24 NOTE — PROGRESS NOTES
Hey there, your labs overall look great but your thyroid is way too fast. I am going to decrease your medication one notch and recheck this in 6 weeks to see that it improves. I will go ahead and send to the pharmacy on file.  Toney Snyder

## 2018-09-24 NOTE — PROGRESS NOTES
Subjective:   57 y.o. female for Well Woman Check. Her gyne and breast care is done elsewhere by her Ob-Gyne physician. ROS: Feeling generally well. No TIA's or unusual headaches, no dysphagia. No prolonged cough. No dyspnea or chest pain on exertion. No abdominal pain, change in bowel habits, black or bloody stools. No urinary tract symptoms. No new or unusual musculoskeletal symptoms. Specific concerns today: needs labs for employer. Objective: The patient appears well, alert, oriented x 3, in no distress. Visit Vitals    /84 (BP 1 Location: Right arm, BP Patient Position: Sitting)    Pulse 81    Temp 98.5 °F (36.9 °C) (Oral)    Resp 18    Ht 5' 3\" (1.6 m)    Wt 228 lb 3.2 oz (103.5 kg)    SpO2 97%    BMI 40.42 kg/m2     ENT normal.  Neck supple. No adenopathy or thyromegaly. DERRELL. Lungs are clear, good air entry, no wheezes, rhonchi or rales. S1 and S2 normal, no murmurs, regular rate and rhythm. Abdomen soft without tenderness, guarding, mass or organomegaly. Extremities show no edema, normal peripheral pulses. Neurological is normal, no focal findings. Breast and Pelvic exams are deferred. Assessment/Plan:   Well Woman  lose weight, increase physical activity, follow low fat diet, follow low salt diet  Encounter Diagnoses   Name Primary?  Well adult exam Yes    Hypothyroidism due to acquired atrophy of thyroid     Vitamin D deficiency     Vitamin B12 deficiency      Orders Placed This Encounter    CBC WITH AUTOMATED DIFF    LIPID PANEL    METABOLIC PANEL, COMPREHENSIVE    TSH 3RD GENERATION    VITAMIN B12    VITAMIN D, 25 HYDROXY    CVD REPORT     Labs updated today   Dev plan with results  I have discussed the diagnosis with the patient and the intended plan as seen in the above orders. The patient has received an after-visit summary and questions were answered concerning future plans.  Patient conveyed understanding of the plan at the time of the visit.    Carlyle Toth, MSN, ANP  9/23/2018

## 2018-09-24 NOTE — TELEPHONE ENCOUNTER
From: Sirisha Cifuentes  To: Bear Peres NP  Sent: 9/24/2018 10:06 AM EDT  Subject: Medication Renewal Request    Original authorizing provider: Bear Peres NP    Laron West would like a refill of the following medications:  levothyroxine (SYNTHROID) 112 mcg tablet Bear Peres NP]    Preferred pharmacy: [address] [place]:  Please change the pharmacy to [facility] in [facility]. I did change this when I came into my last appt. but it was still sent to [place]. I did not pick it up.  Thank you August Muñiz

## 2018-12-10 ENCOUNTER — OFFICE VISIT (OUTPATIENT)
Dept: ENDOCRINOLOGY | Age: 48
End: 2018-12-10

## 2018-12-10 VITALS
SYSTOLIC BLOOD PRESSURE: 154 MMHG | BODY MASS INDEX: 40.79 KG/M2 | HEIGHT: 63 IN | OXYGEN SATURATION: 98 % | DIASTOLIC BLOOD PRESSURE: 71 MMHG | HEART RATE: 76 BPM | TEMPERATURE: 97.1 F | WEIGHT: 230.2 LBS | RESPIRATION RATE: 16 BRPM

## 2018-12-10 DIAGNOSIS — E55.9 VITAMIN D DEFICIENCY: ICD-10-CM

## 2018-12-10 DIAGNOSIS — E06.3 HASHIMOTO'S DISEASE: Primary | ICD-10-CM

## 2018-12-10 DIAGNOSIS — E03.4 HYPOTHYROIDISM DUE TO ACQUIRED ATROPHY OF THYROID: ICD-10-CM

## 2018-12-10 DIAGNOSIS — E53.8 VITAMIN B12 DEFICIENCY: ICD-10-CM

## 2018-12-10 RX ORDER — ERGOCALCIFEROL 1.25 MG/1
CAPSULE ORAL
Qty: 3 CAP | Refills: 3 | Status: SHIPPED | OUTPATIENT
Start: 2018-12-10 | End: 2019-05-13 | Stop reason: SDUPTHER

## 2018-12-10 RX ORDER — LEVOTHYROXINE SODIUM 112 UG/1
TABLET ORAL
Qty: 90 TAB | Refills: 3 | Status: SHIPPED | OUTPATIENT
Start: 2018-12-10 | End: 2019-12-17 | Stop reason: ALTCHOICE

## 2018-12-10 NOTE — PROGRESS NOTES
Samara Watkins is a 57 y.o. female here for   Chief Complaint   Patient presents with    Thyroid Problem       1. Have you been to the ER, urgent care clinic since your last visit? Hospitalized since your last visit? -no    2. Have you seen or consulted any other health care providers outside of the [address] [place] [address] since your last visit?   Include any pap smears or colon screening.-no

## 2018-12-10 NOTE — PROGRESS NOTES
[facility] AND ENDOCRINOLOGY               Amber Shay MD        [address] [address] WR:[phone] Fax [phone]           Patient Information  Date:12/10/2018  Name : Abiola Judd 57 y.o.     YOB: 1970         Referred by: Yannick Billingsley NP         History of present illness    Abiola Judd is a 57 y.o. female  here for fu of thyroid. Taking the medication consistently  Sleeping good  No snoring   Exercising, no further weight loss  Was not able to stick to ketogenic diet  Complains of fatigue  She is menopausal    She  denies sleep apnea symptoms  Denies nervousness,shakiness,palpitations      No FH of thyroid cancer     Wt Readings from Last 3 Encounters:   12/10/18 230 lb 3.2 oz (104.4 kg)   09/21/18 228 lb 3.2 oz (103.5 kg)   06/08/18 224 lb 12.8 oz (102 kg)       Past Medical History:   Diagnosis Date    Goiter diffuse, nontoxic     Hashimoto's disease     Primary hypothyroidism        Current Outpatient Medications   Medication Sig    levothyroxine (SYNTHROID) 112 mcg tablet Take 1 Tab by mouth Daily (before breakfast).  ergocalciferol (ERGOCALCIFEROL) 50,000 unit capsule TAKE ONE CAPSULE BY MOUTH EVERY 30 DAYS (Patient not taking: Reported on 9/21/2018)     No current facility-administered medications for this visit. Review of Systems:  -   - Eyes: no blurry vision or double vision  - Cardiovascular: no chest pain or palpitations  - Musculoskeletal: no joint pains + weakness  - Integumentary: no rashes  -     Physical Examination:  - Blood pressure 154/71, pulse 76, temperature 97.1 °F (36.2 °C), temperature source Oral, resp. rate 16, height 5' 3\" (1.6 m), weight 230 lb 3.2 oz (104.4 kg), SpO2 98 %. Body mass index is 40.78 kg/m².   - General: pleasant, no distress, good eye contact  - HEENT: no exopthalmos, no periorbital edema, no scleral/conjunctival injection, EOMI, no lid lag or stare  - Neck: supple,thyromegaly - US - no goiter   - Cardiovascular: regular,  normal S1 and S2, no murmurs  - Respiratory: clear to auscultation bilaterally  - Gastrointestinal: soft, nontender, nondistended, BS +  - Musculoskeletal: no tremors, no edema  - Neurological: alert and oriented   - Psychiatric: normal mood and affect  - Skin - normal turgor    Data Reviewed:     Lab Results   Component Value Date/Time    TSH 0.127 (L) 12/03/2018 03:00 PM    T4, Free 1.75 12/03/2018 03:00 PM      US -   Impression:  The thyroid gland is diffusely heterogenous in texture with hypoechoic micronodules and surroundning echogenic rim  suggestive of Hashimoto's thyroiditis. No dominant nodules    [x] Reviewed labs    Assessment/Plan:     1. Hashimoto's disease/Primary hypothyroidism - no goiter on US 5/14  2. Obesity Body mass index is 40.78 kg/m². 3. Vitamin D deficiency  4. Postmenopausal     She did not see much difference with Cytomel . Unithroid    Supplement Vit D , Vit B12     Low-salt diet    obesity: Metabolic syndrome, menopause is not  helping either          Follow-up Disposition: Not on File    Thank you for allowing me to participate in the care of this patient.     Vandana Mondragon MD    Patient verbalized understanding

## 2019-05-06 DIAGNOSIS — E55.9 VITAMIN D DEFICIENCY: ICD-10-CM

## 2019-05-06 DIAGNOSIS — E06.3 HASHIMOTO'S DISEASE: ICD-10-CM

## 2019-05-06 DIAGNOSIS — E53.8 VITAMIN B12 DEFICIENCY: ICD-10-CM

## 2019-05-07 LAB
25(OH)D3+25(OH)D2 SERPL-MCNC: 23.1 NG/ML (ref 30–100)
T4 FREE SERPL-MCNC: 1.78 NG/DL (ref 0.82–1.77)
TSH SERPL DL<=0.005 MIU/L-ACNC: 1.26 UIU/ML (ref 0.45–4.5)
VIT B12 SERPL-MCNC: 393 PG/ML (ref 232–1245)

## 2019-05-10 NOTE — PROGRESS NOTES
Juan Luis Dixon is a 54 y.o. female here for Chief Complaint Patient presents with  Thyroid Problem 1. Have you been to the ER, urgent care clinic since your last visit? Hospitalized since your last visit? -no 
 
2. Have you seen or consulted any other health care providers outside of the [address] [place] [address] since your last visit?   Include any pap smears or colon screening.-no

## 2019-05-13 ENCOUNTER — OFFICE VISIT (OUTPATIENT)
Dept: ENDOCRINOLOGY | Age: 49
End: 2019-05-13

## 2019-05-13 VITALS
BODY MASS INDEX: 41.29 KG/M2 | TEMPERATURE: 96.3 F | HEIGHT: 63 IN | RESPIRATION RATE: 14 BRPM | HEART RATE: 67 BPM | SYSTOLIC BLOOD PRESSURE: 150 MMHG | DIASTOLIC BLOOD PRESSURE: 88 MMHG | WEIGHT: 233 LBS | OXYGEN SATURATION: 99 %

## 2019-05-13 DIAGNOSIS — E03.4 HYPOTHYROIDISM DUE TO ACQUIRED ATROPHY OF THYROID: ICD-10-CM

## 2019-05-13 DIAGNOSIS — E53.8 VITAMIN B12 DEFICIENCY: ICD-10-CM

## 2019-05-13 DIAGNOSIS — E55.9 VITAMIN D DEFICIENCY: ICD-10-CM

## 2019-05-13 DIAGNOSIS — E06.3 HASHIMOTO'S DISEASE: Primary | ICD-10-CM

## 2019-05-13 RX ORDER — ERGOCALCIFEROL 1.25 MG/1
CAPSULE ORAL
Qty: 6 CAP | Refills: 3 | Status: SHIPPED | OUTPATIENT
Start: 2019-05-13 | End: 2019-12-17 | Stop reason: SDUPTHER

## 2019-05-13 NOTE — PATIENT INSTRUCTIONS
Check blood pressure once a week at local pharmacy to see what it is running. Goal is less than 130 on the top number and 80 on the bottom number.  
If it is persistently high please call PCP

## 2019-05-13 NOTE — LETTER
5/13/19 Patient: Yulia Lainez YOB: 1970 Date of Visit: 5/13/2019 Rajeev Stevens NP 
[address] [facility] [id number] [phone] VIA In Basket Dear Rajeev Stevens NP, Thank you for referring Ms. Stevenson Rasmussen to [address] [address] for evaluation. My notes for this consultation are attached. If you have questions, please do not hesitate to call me. I look forward to following your patient along with you. Sincerely, Benedict Glass MD

## 2019-05-13 NOTE — PROGRESS NOTES
[facility] DIABETES AND ENDOCRINOLOGY Brock Ramirez MD 
 
    [address] 78 [phone] Fax [phone] Patient Information Date:5/13/2019 Name : Prem Callahan 54 y.o.    
YOB: 1970 Referred by: Steve Duncan NP History of present illness Prem Callahan is a 54 y.o. female  here for fu of thyroid. Taking the medication consistently Sleeping good No snoring Weight has been stable She is not on any diet, planning to go back on low-carb diet soon She is postmenopausal, feels hot and cold She  denies sleep apnea symptoms Denies nervousness,shakiness,palpitations No FH of thyroid cancer Wt Readings from Last 3 Encounters:  
05/13/19 233 lb (105.7 kg) 12/10/18 230 lb 3.2 oz (104.4 kg) 09/21/18 228 lb 3.2 oz (103.5 kg) Past Medical History:  
Diagnosis Date  Goiter diffuse, nontoxic  Hashimoto's disease  Primary hypothyroidism Current Outpatient Medications Medication Sig  
 UNITHROID 112 mcg tablet Take 1 tab by mouth Mon-Sat and 1/2 tab on Sundays. Brand Medically Necessary  ergocalciferol (ERGOCALCIFEROL) 50,000 unit capsule TAKE ONE CAPSULE BY MOUTH EVERY 30 DAYS No current facility-administered medications for this visit. Review of Systems: 
-  
- Eyes: no blurry vision or double vision - Cardiovascular: no chest pain or palpitations - Musculoskeletal: no joint pains + weakness - Integumentary: no rashes - Physical Examination: - Blood pressure 150/88, pulse 67, temperature 96.3 °F (35.7 °C), temperature source Oral, resp. rate 14, height 5' 3\" (1.6 m), weight 233 lb (105.7 kg), SpO2 99 %. Body mass index is 41.27 kg/m². - General: pleasant, no distress, good eye contact 
- HEENT: no exopthalmos, no periorbital edema, no scleral/conjunctival injection, EOMI, no lid lag or stare 
- Neck: supple,thyromegaly - US - no goiter - Cardiovascular: regular,  normal S1 and S2, no murmurs - Respiratory: clear to auscultation bilaterally - Gastrointestinal: soft, nontender, nondistended, BS + 
- Musculoskeletal: no tremors, no edema 
- Neurological: alert and oriented - Psychiatric: normal mood and affect 
- Skin - normal turgor Data Reviewed:  
 
Lab Results Component Value Date/Time TSH 1.260 05/06/2019 08:24 AM  
 T4, Free 1.78 (H) 05/06/2019 08:24 AM  
  
US - Impression: The thyroid gland is diffusely heterogenous in texture with hypoechoic micronodules and surroundning echogenic rim  suggestive of Hashimoto's thyroiditis. No dominant nodules [x] Reviewed labs Assessment/Plan: 1. Hashimoto's disease/Primary hypothyroidism - no goiter on US 5/14 
2. Obesity Body mass index is 41.27 kg/m². 3. Vitamin D deficiency 4. Postmenopausal 
5. Vitamin B12 deficiency She did not see much difference with Cytomel . Unithroid -biochemically euthyroid Supplement Vit D , Vit B12 Blood pressure is elevated:Low-salt diet 
 
obesity: Metabolic syndrome, postmenopausal 
Calorie restriction is the key Thank you for allowing me to participate in the care of this patient. Marline Escalona MD 
 
Patient verbalized understanding

## 2019-11-12 ENCOUNTER — DOCUMENTATION ONLY (OUTPATIENT)
Dept: FAMILY MEDICINE CLINIC | Age: 49
End: 2019-11-12

## 2019-12-11 ENCOUNTER — OFFICE VISIT (OUTPATIENT)
Dept: FAMILY MEDICINE CLINIC | Age: 49
End: 2019-12-11

## 2019-12-11 VITALS
SYSTOLIC BLOOD PRESSURE: 139 MMHG | TEMPERATURE: 98.4 F | OXYGEN SATURATION: 97 % | BODY MASS INDEX: 42.35 KG/M2 | RESPIRATION RATE: 20 BRPM | WEIGHT: 239 LBS | HEART RATE: 80 BPM | HEIGHT: 63 IN | DIASTOLIC BLOOD PRESSURE: 71 MMHG

## 2019-12-11 DIAGNOSIS — E03.4 HYPOTHYROIDISM DUE TO ACQUIRED ATROPHY OF THYROID: ICD-10-CM

## 2019-12-11 DIAGNOSIS — R10.11 RIGHT UPPER QUADRANT PAIN: ICD-10-CM

## 2019-12-11 DIAGNOSIS — Z00.00 WELL ADULT EXAM: Primary | ICD-10-CM

## 2019-12-11 DIAGNOSIS — E55.9 VITAMIN D DEFICIENCY: ICD-10-CM

## 2019-12-11 NOTE — PROGRESS NOTES
Chief Complaint   Patient presents with    Complete Physical     Pt in office today for cpe/labs  -pt has concerns about her gallbladder  -pt states she spoke with provider 1yr ago and is still having issues    1. Have you been to the ER, urgent care clinic since your last visit? Hospitalized since your last visit? [facility] for strep    2. Have you seen or consulted any other health care providers outside of the [address] [place] [address] since your last visit? Include any pap smears or colon screening.  No     Pt has no other concerns

## 2019-12-11 NOTE — PROGRESS NOTES
Subjective:   54 y.o. female for Well Woman Check. Her gyne and breast care is done elsewhere by her Ob-Gyne physician. Patient Active Problem List    Diagnosis Date Noted    Obesity, morbid ([address] 12/08/2017    Amenorrhea 06/06/2017    Vitamin B12 deficiency 08/06/2014    Vitamin D deficiency 08/06/2014    Obesity 03/08/2014    Hashimoto's disease 03/05/2014    Hypothyroid 03/05/2014     Current Outpatient Medications   Medication Sig Dispense Refill    ergocalciferol (ERGOCALCIFEROL) 50,000 unit capsule TAKE ONE CAPSULE BY MOUTH TWICE A MONTH 6 Cap 3    UNITHROID 112 mcg tablet Take 1 tab by mouth Mon-Sat and 1/2 tab on Sundays. Brand Medically Necessary 90 Tab 3     Family History   Problem Relation Age of Onset    Diabetes Maternal Grandmother      Social History     Tobacco Use    Smoking status: Never Smoker    Smokeless tobacco: Never Used   Substance Use Topics    Alcohol use: No             ROS: Feeling generally well. No TIA's or unusual headaches, no dysphagia. No prolonged cough. No dyspnea or chest pain on exertion. No abdominal pain, change in bowel habits, black or bloody stools. No urinary tract symptoms. No new or unusual musculoskeletal symptoms. Specific concerns today: having increase in fatigue. She is still having right upper quad pain, concerned about gallbladder. Objective: The patient appears well, alert, oriented x 3, in no distress. Visit Vitals  /71 (BP 1 Location: Right arm, BP Patient Position: Sitting)   Pulse 80   Temp 98.4 °F (36.9 °C) (Oral)   Resp 20   Ht 5' 3\" (1.6 m)   Wt 239 lb (108.4 kg)   SpO2 97%   BMI 42.34 kg/m²     ENT normal.  Neck supple. No adenopathy or thyromegaly. DERRELL. Lungs are clear, good air entry, no wheezes, rhonchi or rales. S1 and S2 normal, no murmurs, regular rate and rhythm. Abdomen soft without tenderness, guarding, mass or organomegaly. Extremities show no edema, normal peripheral pulses.  Neurological is normal, no focal findings. Breast and Pelvic exams are deferred. Assessment/Plan:   Well Woman  lose weight, increase physical activity, follow low fat diet, follow low salt diet, routine labs ordered  Encounter Diagnoses   Name Primary?  Well adult exam Yes    Hypothyroidism due to acquired atrophy of thyroid     Vitamin D deficiency     Right upper quadrant pain      Orders Placed This Encounter    US ABD COMP    LIPID PANEL    METABOLIC PANEL, COMPREHENSIVE    CBC WITH AUTOMATED DIFF    TSH 3RD GENERATION    VITAMIN D, 25 HYDROXY     I have discussed the diagnosis with the patient and the intended plan as seen in the above orders. The patient has received an after-visit summary and questions were answered concerning future plans. Patient conveyed understanding of the plan at the time of the visit.     Gabriella Mcwilliams, MSN, ANP  12/11/2019

## 2019-12-12 LAB
25(OH)D3+25(OH)D2 SERPL-MCNC: 15.9 NG/ML (ref 30–100)
ALBUMIN SERPL-MCNC: 4.4 G/DL (ref 3.5–5.5)
ALBUMIN/GLOB SERPL: 1.8 {RATIO} (ref 1.2–2.2)
ALP SERPL-CCNC: 112 IU/L (ref 39–117)
ALT SERPL-CCNC: 17 IU/L (ref 0–32)
AST SERPL-CCNC: 14 IU/L (ref 0–40)
BASOPHILS # BLD AUTO: 0.1 X10E3/UL (ref 0–0.2)
BASOPHILS NFR BLD AUTO: 1 %
BILIRUB SERPL-MCNC: 0.2 MG/DL (ref 0–1.2)
BUN SERPL-MCNC: 15 MG/DL (ref 6–24)
BUN/CREAT SERPL: 23 (ref 9–23)
CALCIUM SERPL-MCNC: 9.7 MG/DL (ref 8.7–10.2)
CHLORIDE SERPL-SCNC: 102 MMOL/L (ref 96–106)
CHOLEST SERPL-MCNC: 178 MG/DL (ref 100–199)
CO2 SERPL-SCNC: 21 MMOL/L (ref 20–29)
CREAT SERPL-MCNC: 0.65 MG/DL (ref 0.57–1)
EOSINOPHIL # BLD AUTO: 0.3 X10E3/UL (ref 0–0.4)
EOSINOPHIL NFR BLD AUTO: 2 %
ERYTHROCYTE [DISTWIDTH] IN BLOOD BY AUTOMATED COUNT: 13.4 % (ref 12.3–15.4)
GLOBULIN SER CALC-MCNC: 2.5 G/DL (ref 1.5–4.5)
GLUCOSE SERPL-MCNC: 99 MG/DL (ref 65–99)
HCT VFR BLD AUTO: 40.3 % (ref 34–46.6)
HDLC SERPL-MCNC: 47 MG/DL
HGB BLD-MCNC: 13.2 G/DL (ref 11.1–15.9)
IMM GRANULOCYTES # BLD AUTO: 0.2 X10E3/UL (ref 0–0.1)
IMM GRANULOCYTES NFR BLD AUTO: 1 %
INTERPRETATION, 910389: NORMAL
LDLC SERPL CALC-MCNC: 99 MG/DL (ref 0–99)
LYMPHOCYTES # BLD AUTO: 3 X10E3/UL (ref 0.7–3.1)
LYMPHOCYTES NFR BLD AUTO: 26 %
MCH RBC QN AUTO: 28.9 PG (ref 26.6–33)
MCHC RBC AUTO-ENTMCNC: 32.8 G/DL (ref 31.5–35.7)
MCV RBC AUTO: 88 FL (ref 79–97)
MONOCYTES # BLD AUTO: 0.8 X10E3/UL (ref 0.1–0.9)
MONOCYTES NFR BLD AUTO: 7 %
NEUTROPHILS # BLD AUTO: 7.4 X10E3/UL (ref 1.4–7)
NEUTROPHILS NFR BLD AUTO: 63 %
PLATELET # BLD AUTO: 343 X10E3/UL (ref 150–450)
POTASSIUM SERPL-SCNC: 4.3 MMOL/L (ref 3.5–5.2)
PROT SERPL-MCNC: 6.9 G/DL (ref 6–8.5)
RBC # BLD AUTO: 4.57 X10E6/UL (ref 3.77–5.28)
SODIUM SERPL-SCNC: 141 MMOL/L (ref 134–144)
TRIGL SERPL-MCNC: 160 MG/DL (ref 0–149)
TSH SERPL DL<=0.005 MIU/L-ACNC: 8.32 UIU/ML (ref 0.45–4.5)
VLDLC SERPL CALC-MCNC: 32 MG/DL (ref 5–40)
WBC # BLD AUTO: 11.7 X10E3/UL (ref 3.4–10.8)

## 2019-12-17 DIAGNOSIS — E06.3 HASHIMOTO'S DISEASE: ICD-10-CM

## 2019-12-17 DIAGNOSIS — E55.9 VITAMIN D DEFICIENCY: ICD-10-CM

## 2019-12-17 DIAGNOSIS — E03.4 HYPOTHYROIDISM DUE TO ACQUIRED ATROPHY OF THYROID: ICD-10-CM

## 2019-12-17 RX ORDER — LEVOTHYROXINE SODIUM 125 UG/1
125 TABLET ORAL
Qty: 30 TAB | Refills: 2 | Status: SHIPPED | OUTPATIENT
Start: 2019-12-17 | End: 2020-04-01 | Stop reason: SDUPTHER

## 2019-12-17 RX ORDER — ERGOCALCIFEROL 1.25 MG/1
CAPSULE ORAL
Qty: 4 CAP | Refills: 12 | Status: SHIPPED | OUTPATIENT
Start: 2019-12-17

## 2019-12-17 NOTE — PROGRESS NOTES
Hey there, both of your labs are quite low for thyroid and vitamin D, have you restarted both meds?  Sotero Christy

## 2020-04-02 RX ORDER — LEVOTHYROXINE SODIUM 125 UG/1
125 TABLET ORAL
Qty: 30 TAB | Refills: 2 | Status: SHIPPED | OUTPATIENT
Start: 2020-04-02 | End: 2020-07-06

## 2020-05-19 ENCOUNTER — OFFICE VISIT (OUTPATIENT)
Dept: PRIMARY CARE CLINIC | Age: 50
End: 2020-05-19

## 2020-05-19 DIAGNOSIS — Z20.822 EXPOSURE TO COVID-19 VIRUS: Primary | ICD-10-CM

## 2020-05-19 NOTE — PATIENT INSTRUCTIONS
9 Things To Do If You've Been Exposed to COVID-19    Stay home. If you've been exposed, you should stay in isolation for 14 days. Don't go to school, work, or public areas. And don't use public transportation. Leave your home only if you need to get medical care. But call the doctor's office first so they know you're coming, and wear a cloth face cover when you go. Call your doctor. Call your doctor or other health professional to let them know that you've been exposed. They might want you to be tested, or they may have other instructions for you. If you become sick, wear a face cover when you are around other people. It can help stop the spread of the virus when you cough or sneeze. Limit contact with people in your home. If possible, stay in a separate bedroom and use a separate bathroom. Avoid contact with pets and other animals. Cover your mouth and nose with a tissue when you cough or sneeze. Then throw it in the trash right away. Wash your hands often, especially after you cough or sneeze. Use soap and water, and scrub for at least 20 seconds. If soap and water aren't available, use an alcohol-based hand . Don't share personal household items. These include bedding, towels, cups and glasses, and eating utensils. Clean and disinfect your home every day. Use household  or disinfectant wipes or sprays. Take special care to clean things that you grab with your hands. These include doorknobs, remote controls, phones, and handles on your refrigerator and microwave. And don't forget countertops, tabletops, bathrooms, and computer keyboards. Current as of: April 15, 2020               Content Version: 12.4  © 0816-8880 [employer].    Care instructions adapted under license by your healthcare professional. If you have questions about a medical condition or this instruction, always ask your healthcare professional. [employer] disclaims any warranty or liability for your use of this information.

## 2020-05-22 LAB — SARS-COV-2, NAA: NOT DETECTED

## 2020-05-26 RX ORDER — ESCITALOPRAM OXALATE 10 MG/1
10 TABLET ORAL DAILY
Qty: 30 TAB | Refills: 3 | Status: SHIPPED | OUTPATIENT
Start: 2020-05-26 | End: 2020-06-10

## 2020-06-10 ENCOUNTER — VIRTUAL VISIT (OUTPATIENT)
Dept: FAMILY MEDICINE CLINIC | Age: 50
End: 2020-06-10

## 2020-06-10 DIAGNOSIS — R10.11 RIGHT UPPER QUADRANT PAIN: ICD-10-CM

## 2020-06-10 DIAGNOSIS — F41.9 ANXIETY: Primary | ICD-10-CM

## 2020-06-10 RX ORDER — ALPRAZOLAM 0.25 MG/1
0.25 TABLET ORAL
COMMUNITY
Start: 2020-06-10

## 2020-06-10 NOTE — PROGRESS NOTES
Rubio Forman is a 52 y.o. female who was seen by synchronous (real-time) audio-video technology on 6/10/2020. Consent: Rubio Forman, who was seen by synchronous (real-time) audio-video technology, and/or her healthcare decision maker, is aware that this patient-initiated, Telehealth encounter on 6/10/2020 is a billable service, with coverage as determined by her insurance carrier. She is aware that she may receive a bill and has provided verbal consent to proceed: Yes. I spent at least 25 minutes on this visit with this established patient. 712  Subjective:   Rubio Forman is a 52 y.o. female who was seen for Anxiety  she was having tremendous anxiety 2 weeks ago, went to [email or web address] who gave her 27 xanax  She has taken 2 pills  Did not like the lexapro, made her anxiety worse  Life has calmed down with her husbands health and anxiety has improved    Never did her [address] for gallstones  COVID made it get cancelled  Wants to know how to get this rescheduled    Prior to Admission medications    Medication Sig Start Date End Date Taking? Authorizing Provider   ALPRAZolam Kimberly See) 0.25 mg tablet Take 1 Tab by mouth two (2) times daily as needed for Anxiety. Max Daily Amount: 0.5 mg. 6/10/20  Yes Matteo Montes De Oca NP   escitalopram oxalate (LEXAPRO) 10 mg tablet Take 1 Tab by mouth daily. 5/26/20 6/10/20  Matteo Montes De Oca NP   levothyroxine (Unithroid) 125 mcg tablet Take 1 Tab by mouth Daily (before breakfast). 4/2/20   Matteo Montes De Oca NP   ergocalciferol (ERGOCALCIFEROL) 50,000 unit capsule TAKE ONE CAPSULE ONCE A WEEK 12/17/19   Matteo Montes De Oca NP     No Known Allergies    Patient Active Problem List    Diagnosis Date Noted    Obesity, morbid ([facility] 12/08/2017    Amenorrhea 06/06/2017    Vitamin B12 deficiency 08/06/2014    Vitamin D deficiency 08/06/2014    Obesity 03/08/2014    Hashimoto's disease 03/05/2014    Hypothyroid 03/05/2014       ROS      Objective:    There were no vitals taken for this visit. General: alert, cooperative, no distress   Mental  status: normal mood, behavior, speech, dress, motor activity, and thought processes, able to follow commands   HENT: NCAT   Neck: no visualized mass   Resp: no respiratory distress   Neuro: no gross deficits   Skin: no discoloration or lesions of concern on visible areas   Psychiatric: normal affect, consistent with stated mood, no evidence of hallucinations     Additional exam findings: none    Diagnoses and all orders for this visit:    1. Anxiety    2. Right upper quadrant pain      Will cont to use the xanax very sparingly and follow up with [employer] message if need to restart lexapro at lower dose  She will also go ahead and call scheduling to get US done for gallbladder workup  Dev plan with result      We discussed the expected course, resolution and complications of the diagnosis(es) in detail. Medication risks, benefits, costs, interactions, and alternatives were discussed as indicated. I advised her to contact the office if her condition worsens, changes or fails to improve as anticipated. She expressed understanding with the diagnosis(es) and plan. Reinier Odom is a 52 y.o. female who was evaluated by a video visit encounter for concerns as above. Patient identification was verified prior to start of the visit. A caregiver was present when appropriate. Due to this being a TeleHealth encounter ([facility]), evaluation of the following organ systems was limited: Vitals/Constitutional/EENT/Resp/CV/GI//MS/Neuro/Skin/Heme-Lymph-Imm. Pursuant to the emergency declaration under the [facility], [facility]5 waiver authority and the [employer] Act, this Virtual  Visit was conducted, with patient's (and/or legal guardian's) consent, to reduce the patient's risk of exposure to COVID-19 and provide necessary medical care. Services were provided through a video synchronous discussion virtually to substitute for in-person clinic visit. Patient and provider were located at their individual homes.       Sarita Zafar NP

## 2020-07-06 RX ORDER — LEVOTHYROXINE SODIUM 125 UG/1
TABLET ORAL
Qty: 30 TAB | Refills: 2 | Status: SHIPPED | OUTPATIENT
Start: 2020-07-06 | End: 2020-10-20 | Stop reason: SDUPTHER

## 2020-10-06 ENCOUNTER — OFFICE VISIT (OUTPATIENT)
Dept: FAMILY MEDICINE CLINIC | Age: 50
End: 2020-10-06
Payer: COMMERCIAL

## 2020-10-06 VITALS
HEIGHT: 63 IN | RESPIRATION RATE: 12 BRPM | DIASTOLIC BLOOD PRESSURE: 73 MMHG | OXYGEN SATURATION: 96 % | BODY MASS INDEX: 40.04 KG/M2 | WEIGHT: 226 LBS | SYSTOLIC BLOOD PRESSURE: 119 MMHG | TEMPERATURE: 98.3 F | HEART RATE: 75 BPM

## 2020-10-06 DIAGNOSIS — R30.0 DYSURIA: ICD-10-CM

## 2020-10-06 DIAGNOSIS — N30.00 ACUTE CYSTITIS WITHOUT HEMATURIA: Primary | ICD-10-CM

## 2020-10-06 LAB
BILIRUB UR QL STRIP: NEGATIVE
GLUCOSE UR-MCNC: NEGATIVE MG/DL
KETONES P FAST UR STRIP-MCNC: NEGATIVE MG/DL
PH UR STRIP: 5.5 [PH] (ref 4.6–8)
PROT UR QL STRIP: NORMAL
SP GR UR STRIP: 1.02 (ref 1–1.03)
UA UROBILINOGEN AMB POC: NORMAL (ref 0.2–1)
URINALYSIS CLARITY POC: CLEAR
URINALYSIS COLOR POC: YELLOW
URINE BLOOD POC: NORMAL
URINE LEUKOCYTES POC: NORMAL
URINE NITRITES POC: NEGATIVE

## 2020-10-06 PROCEDURE — 81003 URINALYSIS AUTO W/O SCOPE: CPT | Performed by: NURSE PRACTITIONER

## 2020-10-06 PROCEDURE — 99213 OFFICE O/P EST LOW 20 MIN: CPT | Performed by: NURSE PRACTITIONER

## 2020-10-06 RX ORDER — CIPROFLOXACIN 500 MG/1
500 TABLET ORAL 2 TIMES DAILY
Qty: 10 TAB | Refills: 0 | Status: SHIPPED | OUTPATIENT
Start: 2020-10-06 | End: 2020-10-11

## 2020-10-06 NOTE — PROGRESS NOTES
Chief Complaint   Patient presents with    UTI     Pt in office today for possible uti  -pt states she has been having burning since friday    1. Have you been to the ER, urgent care clinic since your last visit? Hospitalized since your last visit? No    2. Have you seen or consulted any other health care providers outside of the [address] [place] [address] since your last visit? Include any pap smears or colon screening.  No     Pt has no other concerns

## 2020-10-12 NOTE — PROGRESS NOTES
Sagrario Chakraborty is a 52 y.o. female who complains of dysuria, frequency, urgency for 4 days. Patient denies flank pain, vomiting, fever, unusual vaginal discharge. Patient does not have a history of recurrent UTI. Patient does not have a history of pyelonephritis. Review of Systems  Pertinent items are noted in HPI. Objective:     Visit Vitals  /73 (BP 1 Location: Right arm, BP Patient Position: Sitting)   Pulse 75   Temp 98.3 °F (36.8 °C) (Oral)   Resp 12   Ht 5' 3\" (1.6 m)   Wt 226 lb (102.5 kg)   SpO2 96%   BMI 40.03 kg/m²     General:  alert, cooperative, no distress   Abdomen: soft, nontender, nondistended, no masses or organomegaly. Back:  CVA tenderness absent   :  defer exam     Laboratory:   Urine dipstick shows positive for leukocytes. Micro exam: not done. Assessment/Plan:     Acute cystitis     1. ciprofloxacin  2. Maintain adequate hydration  3. May use OTC pyridium as desired, which will turn urine orange/red color  4. Follow up if symptoms not improving, and prn. Encounter Diagnoses   Name Primary?  Acute cystitis without hematuria Yes    Dysuria      Orders Placed This Encounter    AMB POC URINALYSIS DIP STICK AUTO W/O MICRO    ciprofloxacin HCl (CIPRO) 500 mg tablet   . I have discussed the diagnosis with the patient and the intended plan as seen in the above orders. The patient has received an after-visit summary and questions were answered concerning future plans. Patient conveyed understanding of the plan at the time of the visit.     Michael Ambrocio, MSN, ANP  10/12/2020

## 2020-10-20 RX ORDER — LEVOTHYROXINE SODIUM 125 UG/1
TABLET ORAL
Qty: 30 TAB | Refills: 2 | Status: SHIPPED | OUTPATIENT
Start: 2020-10-20 | End: 2021-01-20 | Stop reason: SDUPTHER

## 2021-01-20 RX ORDER — LEVOTHYROXINE SODIUM 125 UG/1
TABLET ORAL
Qty: 30 TAB | Refills: 2 | Status: SHIPPED | OUTPATIENT
Start: 2021-01-20 | End: 2021-04-28 | Stop reason: SDUPTHER

## 2021-04-28 RX ORDER — LEVOTHYROXINE SODIUM 125 UG/1
TABLET ORAL
Qty: 30 TAB | Refills: 2 | Status: SHIPPED | OUTPATIENT
Start: 2021-04-28 | End: 2021-08-15

## 2021-08-03 ENCOUNTER — OFFICE VISIT (OUTPATIENT)
Dept: FAMILY MEDICINE CLINIC | Age: 51
End: 2021-08-03
Payer: COMMERCIAL

## 2021-08-03 VITALS
HEART RATE: 69 BPM | WEIGHT: 229 LBS | SYSTOLIC BLOOD PRESSURE: 134 MMHG | BODY MASS INDEX: 40.57 KG/M2 | RESPIRATION RATE: 14 BRPM | TEMPERATURE: 98.1 F | DIASTOLIC BLOOD PRESSURE: 68 MMHG | HEIGHT: 63 IN | OXYGEN SATURATION: 97 %

## 2021-08-03 DIAGNOSIS — Z00.00 WELL ADULT EXAM: Primary | ICD-10-CM

## 2021-08-03 DIAGNOSIS — R10.11 RIGHT UPPER QUADRANT PAIN: ICD-10-CM

## 2021-08-03 DIAGNOSIS — E66.01 OBESITY, CLASS III, BMI 40-49.9 (MORBID OBESITY) (HCC): ICD-10-CM

## 2021-08-03 PROCEDURE — 99396 PREV VISIT EST AGE 40-64: CPT | Performed by: NURSE PRACTITIONER

## 2021-08-03 NOTE — PROGRESS NOTES
Subjective:   54 y.o. female for Well Woman Check. Her gyne and breast care is done elsewhere by her Ob-Gyne physician. Patient Active Problem List    Diagnosis Date Noted    Obesity, morbid ([address] 12/08/2017    Amenorrhea 06/06/2017    Vitamin B12 deficiency 08/06/2014    Vitamin D deficiency 08/06/2014    Obesity 03/08/2014    Hashimoto's disease 03/05/2014    Hypothyroid 03/05/2014     Current Outpatient Medications   Medication Sig Dispense Refill    levothyroxine (SYNTHROID) 125 mcg tablet TAKE 1 TABLET BY MOUTH DAILY BEFORE BREAKFAST 30 Tab 2    ergocalciferol (ERGOCALCIFEROL) 50,000 unit capsule TAKE ONE CAPSULE ONCE A WEEK 4 Cap 12    ALPRAZolam (XANAX) 0.25 mg tablet Take 1 Tab by mouth two (2) times daily as needed for Anxiety. Max Daily Amount: 0.5 mg. (Patient not taking: Reported on 8/3/2021)       Family History   Problem Relation Age of Onset    Diabetes Maternal Grandmother      Social History     Tobacco Use    Smoking status: Never Smoker    Smokeless tobacco: Never Used   Substance Use Topics    Alcohol use: No             ROS: Feeling generally well. No TIA's or unusual headaches, no dysphagia. No prolonged cough. No dyspnea or chest pain on exertion. No abdominal pain, change in bowel habits, black or bloody stools. No urinary tract symptoms. No new or unusual musculoskeletal symptoms. Specific concerns today: she is having severe right upper quad pain; no gerd sx, intermittent bowel changes, either constipation or diarrhea, has ruled out food triggers, does have stress. Objective: The patient appears well, alert, oriented x 3, in no distress. Visit Vitals  /68 (BP 1 Location: Left upper arm, BP Patient Position: Sitting)   Pulse 69   Temp 98.1 °F (36.7 °C) (Oral)   Resp 14   Ht 5' 3\" (1.6 m)   Wt 229 lb (103.9 kg)   SpO2 97%   BMI 40.57 kg/m²     ENT normal.  Neck supple. No adenopathy or thyromegaly. DERRELL.  Lungs are clear, good air entry, no wheezes, rhonchi or rales. S1 and S2 normal, no murmurs, regular rate and rhythm. Abdomen soft without tenderness, guarding, mass or organomegaly. Extremities show no edema, normal peripheral pulses. Neurological is normal, no focal findings. Breast and Pelvic exams are deferred. Assessment/Plan:   Well Woman  lose weight, increase physical activity, follow low fat diet, follow low salt diet, routine labs ordered  Encounter Diagnoses   Name Primary?  Well adult exam Yes    Right upper quadrant pain     Obesity, Class III, BMI 40-49.9 (morbid obesity) ([facility]      Orders Placed This Encounter    US ABD COMP    CBC WITH AUTOMATED DIFF    METABOLIC PANEL, COMPREHENSIVE    TSH 3RD GENERATION    LIPID PANEL    LIPASE    AMYLASE     Labs updated  US abd ordered, may need HIDA scan if normal again    I have discussed the diagnosis with the patient and the intended plan as seen in the above orders. The patient has received an after-visit summary and questions were answered concerning future plans. Patient conveyed understanding of the plan at the time of the visit.     Zana Amaro, MSN, ANP  8/3/2021

## 2021-08-03 NOTE — PROGRESS NOTES
Chief Complaint   Patient presents with    Abdominal Pain     Pt being seen for abdominal pain  -pt states this has been going on for awhile and denies any urinary issues  -pt states it just hurts when asked to describe the pain    1. Have you been to the ER, urgent care clinic since your last visit? Hospitalized since your last visit? No    2. Have you seen or consulted any other health care providers outside of the [address] [place] [address] since your last visit? Include any pap smears or colon screening.  No     Pt has no other concerns

## 2021-08-04 LAB
ALBUMIN SERPL-MCNC: 4.6 G/DL (ref 3.8–4.8)
ALBUMIN/GLOB SERPL: 1.8 {RATIO} (ref 1.2–2.2)
ALP SERPL-CCNC: 106 IU/L (ref 48–121)
ALT SERPL-CCNC: 17 IU/L (ref 0–32)
AMYLASE SERPL-CCNC: 46 U/L (ref 31–110)
AST SERPL-CCNC: 16 IU/L (ref 0–40)
BASOPHILS # BLD AUTO: 0.1 X10E3/UL (ref 0–0.2)
BASOPHILS NFR BLD AUTO: 1 %
BILIRUB SERPL-MCNC: 0.2 MG/DL (ref 0–1.2)
BUN SERPL-MCNC: 12 MG/DL (ref 6–24)
BUN/CREAT SERPL: 16 (ref 9–23)
CALCIUM SERPL-MCNC: 10 MG/DL (ref 8.7–10.2)
CHLORIDE SERPL-SCNC: 102 MMOL/L (ref 96–106)
CHOLEST SERPL-MCNC: 206 MG/DL (ref 100–199)
CO2 SERPL-SCNC: 24 MMOL/L (ref 20–29)
CREAT SERPL-MCNC: 0.74 MG/DL (ref 0.57–1)
EOSINOPHIL # BLD AUTO: 0.3 X10E3/UL (ref 0–0.4)
EOSINOPHIL NFR BLD AUTO: 3 %
ERYTHROCYTE [DISTWIDTH] IN BLOOD BY AUTOMATED COUNT: 13.5 % (ref 11.7–15.4)
GLOBULIN SER CALC-MCNC: 2.5 G/DL (ref 1.5–4.5)
GLUCOSE SERPL-MCNC: 95 MG/DL (ref 65–99)
HCT VFR BLD AUTO: 41.2 % (ref 34–46.6)
HDLC SERPL-MCNC: 50 MG/DL
HGB BLD-MCNC: 13.2 G/DL (ref 11.1–15.9)
IMM GRANULOCYTES # BLD AUTO: 0.1 X10E3/UL (ref 0–0.1)
IMM GRANULOCYTES NFR BLD AUTO: 1 %
IMP & REVIEW OF LAB RESULTS: NORMAL
LDLC SERPL CALC-MCNC: 125 MG/DL (ref 0–99)
LIPASE SERPL-CCNC: 32 U/L (ref 14–72)
LYMPHOCYTES # BLD AUTO: 2.4 X10E3/UL (ref 0.7–3.1)
LYMPHOCYTES NFR BLD AUTO: 27 %
MCH RBC QN AUTO: 28.3 PG (ref 26.6–33)
MCHC RBC AUTO-ENTMCNC: 32 G/DL (ref 31.5–35.7)
MCV RBC AUTO: 88 FL (ref 79–97)
MONOCYTES # BLD AUTO: 0.6 X10E3/UL (ref 0.1–0.9)
MONOCYTES NFR BLD AUTO: 7 %
NEUTROPHILS # BLD AUTO: 5.4 X10E3/UL (ref 1.4–7)
NEUTROPHILS NFR BLD AUTO: 61 %
PLATELET # BLD AUTO: 308 X10E3/UL (ref 150–450)
POTASSIUM SERPL-SCNC: 4.1 MMOL/L (ref 3.5–5.2)
PROT SERPL-MCNC: 7.1 G/DL (ref 6–8.5)
RBC # BLD AUTO: 4.66 X10E6/UL (ref 3.77–5.28)
SODIUM SERPL-SCNC: 141 MMOL/L (ref 134–144)
SPECIMEN STATUS REPORT, ROLRST: NORMAL
TRIGL SERPL-MCNC: 178 MG/DL (ref 0–149)
TSH SERPL DL<=0.005 MIU/L-ACNC: 18.3 UIU/ML (ref 0.45–4.5)
VLDLC SERPL CALC-MCNC: 31 MG/DL (ref 5–40)
WBC # BLD AUTO: 8.8 X10E3/UL (ref 3.4–10.8)

## 2021-08-10 NOTE — PROGRESS NOTES
Hey there, your thyroid is extremely slow, did you run out of meds? It is also making your cholesterol a little high which should improve as the thyroid improves. Please give me some insight.  [facility]

## 2021-08-12 ENCOUNTER — HOSPITAL ENCOUNTER (OUTPATIENT)
Dept: ULTRASOUND IMAGING | Age: 51
Discharge: HOME | End: 2021-08-12
Payer: COMMERCIAL

## 2021-08-12 DIAGNOSIS — R10.11 RIGHT UPPER QUADRANT PAIN: ICD-10-CM

## 2021-08-12 PROCEDURE — 76700 US EXAM ABDOM COMPLETE: CPT

## 2021-08-12 NOTE — PROGRESS NOTES
Your US is negative for gallstones. You do have a fatty liver. This is largely related to high fat diet and the need to loose weight. Fatty liver does not cause pain so if this does get better let me know.  Amy Ceballos

## 2021-08-15 RX ORDER — LEVOTHYROXINE SODIUM 137 UG/1
137 TABLET ORAL
Qty: 30 TABLET | Refills: 1 | Status: SHIPPED | OUTPATIENT
Start: 2021-08-15 | End: 2021-10-27

## 2021-10-27 RX ORDER — LEVOTHYROXINE SODIUM 137 UG/1
TABLET ORAL
Qty: 30 TABLET | Refills: 1 | Status: SHIPPED | OUTPATIENT
Start: 2021-10-27

## 2022-03-19 PROBLEM — N91.2 AMENORRHEA: Status: ACTIVE | Noted: 2017-06-06

## 2022-03-19 PROBLEM — E66.01 OBESITY, MORBID (HCC): Status: ACTIVE | Noted: 2017-12-08
